# Patient Record
Sex: FEMALE | Race: WHITE | NOT HISPANIC OR LATINO
[De-identification: names, ages, dates, MRNs, and addresses within clinical notes are randomized per-mention and may not be internally consistent; named-entity substitution may affect disease eponyms.]

---

## 2021-03-16 ENCOUNTER — NON-APPOINTMENT (OUTPATIENT)
Age: 61
End: 2021-03-16

## 2021-03-16 ENCOUNTER — APPOINTMENT (OUTPATIENT)
Dept: CARDIOLOGY | Facility: CLINIC | Age: 61
End: 2021-03-16
Payer: COMMERCIAL

## 2021-03-16 VITALS
BODY MASS INDEX: 40.97 KG/M2 | HEIGHT: 64 IN | WEIGHT: 240 LBS | HEART RATE: 90 BPM | SYSTOLIC BLOOD PRESSURE: 150 MMHG | OXYGEN SATURATION: 98 % | DIASTOLIC BLOOD PRESSURE: 85 MMHG

## 2021-03-16 PROCEDURE — 93000 ELECTROCARDIOGRAM COMPLETE: CPT

## 2021-03-16 PROCEDURE — 99244 OFF/OP CNSLTJ NEW/EST MOD 40: CPT

## 2021-03-16 PROCEDURE — 99072 ADDL SUPL MATRL&STAF TM PHE: CPT

## 2021-03-16 RX ORDER — ASPIRIN ENTERIC COATED TABLETS 81 MG 81 MG/1
81 TABLET, DELAYED RELEASE ORAL
Refills: 0 | Status: ACTIVE | COMMUNITY

## 2021-03-17 LAB
25(OH)D3 SERPL-MCNC: 35.2 NG/ML
ALBUMIN SERPL ELPH-MCNC: 4.4 G/DL
ALP BLD-CCNC: 110 U/L
ALT SERPL-CCNC: 26 U/L
ANION GAP SERPL CALC-SCNC: 13 MMOL/L
AST SERPL-CCNC: 28 U/L
BASOPHILS # BLD AUTO: 0.06 K/UL
BASOPHILS NFR BLD AUTO: 0.5 %
BILIRUB SERPL-MCNC: 0.3 MG/DL
BUN SERPL-MCNC: 16 MG/DL
CALCIUM SERPL-MCNC: 9.6 MG/DL
CHLORIDE SERPL-SCNC: 105 MMOL/L
CHOLEST SERPL-MCNC: 256 MG/DL
CO2 SERPL-SCNC: 26 MMOL/L
COVID-19 NUCLEOCAPSID  GAM ANTIBODY INTERPRETATION: NEGATIVE
CREAT SERPL-MCNC: 0.8 MG/DL
EOSINOPHIL # BLD AUTO: 0.18 K/UL
EOSINOPHIL NFR BLD AUTO: 1.4 %
ESTIMATED AVERAGE GLUCOSE: 120 MG/DL
GLUCOSE SERPL-MCNC: 94 MG/DL
HBA1C MFR BLD HPLC: 5.8 %
HCT VFR BLD CALC: 42.9 %
HDLC SERPL-MCNC: 37 MG/DL
HGB BLD-MCNC: 13.5 G/DL
IMM GRANULOCYTES NFR BLD AUTO: 0.4 %
LDLC SERPL CALC-MCNC: 153 MG/DL
LYMPHOCYTES # BLD AUTO: 2.85 K/UL
LYMPHOCYTES NFR BLD AUTO: 22.8 %
MAN DIFF?: NORMAL
MCHC RBC-ENTMCNC: 27.1 PG
MCHC RBC-ENTMCNC: 31.5 GM/DL
MCV RBC AUTO: 86.1 FL
MONOCYTES # BLD AUTO: 0.77 K/UL
MONOCYTES NFR BLD AUTO: 6.2 %
NEUTROPHILS # BLD AUTO: 8.57 K/UL
NEUTROPHILS NFR BLD AUTO: 68.7 %
NONHDLC SERPL-MCNC: 219 MG/DL
PLATELET # BLD AUTO: 284 K/UL
POTASSIUM SERPL-SCNC: 4.1 MMOL/L
PROT SERPL-MCNC: 6.8 G/DL
RBC # BLD: 4.98 M/UL
RBC # FLD: 13.5 %
SARS-COV-2 AB SERPL QL IA: 0.08 INDEX
SODIUM SERPL-SCNC: 143 MMOL/L
TRIGL SERPL-MCNC: 325 MG/DL
TSH SERPL-ACNC: 0.33 UIU/ML
VIT B12 SERPL-MCNC: 603 PG/ML
WBC # FLD AUTO: 12.48 K/UL

## 2021-03-19 NOTE — PHYSICAL EXAM
[General Appearance - Well Developed] : well developed [Normal Appearance] : normal appearance [Well Groomed] : well groomed [General Appearance - Well Nourished] : well nourished [No Deformities] : no deformities [General Appearance - In No Acute Distress] : no acute distress [Normal Conjunctiva] : the conjunctiva exhibited no abnormalities [Eyelids - No Xanthelasma] : the eyelids demonstrated no xanthelasmas [Normal Oral Mucosa] : normal oral mucosa [No Oral Pallor] : no oral pallor [No Oral Cyanosis] : no oral cyanosis [Normal Jugular Venous A Waves Present] : normal jugular venous A waves present [Normal Jugular Venous V Waves Present] : normal jugular venous V waves present [No Jugular Venous King A Waves] : no jugular venous king A waves [Heart Rate And Rhythm] : heart rate and rhythm were normal [Heart Sounds] : normal S1 and S2 [Murmurs] : no murmurs present [Respiration, Rhythm And Depth] : normal respiratory rhythm and effort [Exaggerated Use Of Accessory Muscles For Inspiration] : no accessory muscle use [Auscultation Breath Sounds / Voice Sounds] : lungs were clear to auscultation bilaterally [Abdomen Soft] : soft [Abdomen Tenderness] : non-tender [Abdomen Mass (___ Cm)] : no abdominal mass palpated [Abnormal Walk] : normal gait [Gait - Sufficient For Exercise Testing] : the gait was sufficient for exercise testing [Nail Clubbing] : no clubbing of the fingernails [Cyanosis, Localized] : no localized cyanosis [Petechial Hemorrhages (___cm)] : no petechial hemorrhages [Skin Color & Pigmentation] : normal skin color and pigmentation [] : no rash [No Venous Stasis] : no venous stasis [Skin Lesions] : no skin lesions [No Skin Ulcers] : no skin ulcer [No Xanthoma] : no  xanthoma was observed [Oriented To Time, Place, And Person] : oriented to person, place, and time [Affect] : the affect was normal [Mood] : the mood was normal [No Anxiety] : not feeling anxious

## 2021-03-19 NOTE — HISTORY OF PRESENT ILLNESS
[FreeTextEntry1] : Tonya is a 60-year-old female whose  recently had OHS. This morning at work /105 and was feeling whoozy and headache. Took Tylenol and now /85. She has hyperlipidemia and was on crestor and stopped it a while ago. Stopped smoking 8 months ago and has gained 50 pounds. She gets short of breath easily. No chest pain, palpitations or syncope. 
Discharged

## 2021-03-19 NOTE — DISCUSSION/SUMMARY
[___ Month(s)] : [unfilled] month(s) [FreeTextEntry1] : The patient is a 60-year-old female recent ex-smoker, hyperlipidemia with symptomatic hypertension. \par #1 CV- echo ordered\par #2 Htn- start amlodipine 2.5mg and titrate to BP control\par #3 Lipids- start atorvastatin after labs today\par #4 General- Recent ex-smoker, 50 pound weight gain may be contributing to symptoms. \par We discussed adherence to a Mediterranean diet, weight loss and at least 30 minutes of daily exercise.\par Should receive COVID vaccine.

## 2021-03-22 RX ORDER — ATORVASTATIN CALCIUM 20 MG/1
20 TABLET, FILM COATED ORAL
Qty: 1 | Refills: 1 | Status: DISCONTINUED | COMMUNITY
Start: 2021-03-17 | End: 2021-03-22

## 2021-04-08 ENCOUNTER — APPOINTMENT (OUTPATIENT)
Dept: CARDIOLOGY | Facility: CLINIC | Age: 61
End: 2021-04-08
Payer: COMMERCIAL

## 2021-04-08 VITALS — SYSTOLIC BLOOD PRESSURE: 136 MMHG | DIASTOLIC BLOOD PRESSURE: 84 MMHG

## 2021-04-08 VITALS
DIASTOLIC BLOOD PRESSURE: 89 MMHG | BODY MASS INDEX: 36.54 KG/M2 | WEIGHT: 214 LBS | HEIGHT: 64 IN | SYSTOLIC BLOOD PRESSURE: 152 MMHG | OXYGEN SATURATION: 99 % | HEART RATE: 92 BPM

## 2021-04-08 PROCEDURE — 99072 ADDL SUPL MATRL&STAF TM PHE: CPT

## 2021-04-08 PROCEDURE — 93000 ELECTROCARDIOGRAM COMPLETE: CPT

## 2021-04-08 PROCEDURE — 99214 OFFICE O/P EST MOD 30 MIN: CPT

## 2021-04-09 NOTE — HISTORY OF PRESENT ILLNESS
[FreeTextEntry1] : Tonya is a 60-year-old female whose  recently had OHS. This morning at work /105 and was feeling whoozy and headache. Took Tylenol and now /85. She has hyperlipidemia and was on crestor and stopped it a while ago. Stopped smoking 8 months ago and has gained 50 pounds. She gets short of breath easily. No chest pain, palpitations or syncope.

## 2021-04-09 NOTE — DISCUSSION/SUMMARY
[___ Month(s)] : [unfilled] month(s) [FreeTextEntry1] : The patient is a 61-year-old female recent ex-smoker, hyperlipidemia with symptomatic hypertension. \par #1 CV- echo ordered\par #2 Htn- continue amlodipine 2.5 mg and titrate to BP control\par #3 Lipids- continue atorvastatin after labs today\par #4 General- Recent ex-smoker, 50 pound weight gain may be contributing to symptoms. \par We discussed adherence to a Mediterranean diet, weight loss and at least 30 minutes of daily exercise. Received 1st COVID vaccine.

## 2021-04-09 NOTE — REVIEW OF SYSTEMS
[Shortness Of Breath] : shortness of breath [Dyspnea on exertion] : dyspnea during exertion [Negative] : Heme/Lymph [Recent Weight Gain (___ Lbs)] : no recent weight gain [Recent Weight Loss (___ Lbs)] : recent [unfilled] ~Ulb weight loss [Chest Pain] : no chest pain [Lower Ext Edema] : no extremity edema [Palpitations] : no palpitations

## 2021-04-23 ENCOUNTER — OUTPATIENT (OUTPATIENT)
Dept: OUTPATIENT SERVICES | Facility: HOSPITAL | Age: 61
LOS: 1 days | End: 2021-04-23
Payer: COMMERCIAL

## 2021-04-23 ENCOUNTER — APPOINTMENT (OUTPATIENT)
Dept: CV DIAGNOSITCS | Facility: HOSPITAL | Age: 61
End: 2021-04-23

## 2021-04-23 DIAGNOSIS — I25.10 ATHEROSCLEROTIC HEART DISEASE OF NATIVE CORONARY ARTERY WITHOUT ANGINA PECTORIS: ICD-10-CM

## 2021-04-23 PROCEDURE — 93306 TTE W/DOPPLER COMPLETE: CPT

## 2021-04-23 PROCEDURE — 93306 TTE W/DOPPLER COMPLETE: CPT | Mod: 26

## 2021-06-10 ENCOUNTER — NON-APPOINTMENT (OUTPATIENT)
Age: 61
End: 2021-06-10

## 2021-06-10 ENCOUNTER — APPOINTMENT (OUTPATIENT)
Dept: CARDIOLOGY | Facility: CLINIC | Age: 61
End: 2021-06-10
Payer: COMMERCIAL

## 2021-06-10 VITALS
HEART RATE: 79 BPM | DIASTOLIC BLOOD PRESSURE: 74 MMHG | OXYGEN SATURATION: 96 % | BODY MASS INDEX: 36.54 KG/M2 | WEIGHT: 214 LBS | HEIGHT: 64 IN | SYSTOLIC BLOOD PRESSURE: 126 MMHG

## 2021-06-10 PROCEDURE — 99072 ADDL SUPL MATRL&STAF TM PHE: CPT

## 2021-06-10 PROCEDURE — 99214 OFFICE O/P EST MOD 30 MIN: CPT

## 2021-06-10 PROCEDURE — 93000 ELECTROCARDIOGRAM COMPLETE: CPT

## 2021-06-12 NOTE — DISCUSSION/SUMMARY
[___ Month(s)] : in [unfilled] month(s) [FreeTextEntry1] : The patient is a 61-year-old female recent ex-smoker, hyperlipidemia,hypertension. \par #1 CV- echo Nl function, diastolic dysfunction\par #2 Htn- continue amlodipine 5 mg \par #3 Lipids- continue atorvastatin \par #4 General- Recent ex-smoker, lost half of her weight gain.\par We discussed adherence to a Mediterranean diet, weight loss and at least 30 minutes of daily exercise. Received Moderna vaccine.

## 2021-06-12 NOTE — HISTORY OF PRESENT ILLNESS
[FreeTextEntry1] : Tonya is feeling well on current medications. Not smoking but her  is smoking. No chest pain, palpitations or shortness of breath. Maintained weight loss.

## 2021-06-12 NOTE — REVIEW OF SYSTEMS
[SOB] : no shortness of breath [Dyspnea on exertion] : not dyspnea during exertion [Chest Discomfort] : no chest discomfort [Lower Ext Edema] : no extremity edema [Leg Claudication] : no intermittent leg claudication [Palpitations] : no palpitations [Negative] : Heme/Lymph

## 2021-07-18 ENCOUNTER — NON-APPOINTMENT (OUTPATIENT)
Age: 61
End: 2021-07-18

## 2021-09-30 ENCOUNTER — APPOINTMENT (OUTPATIENT)
Dept: CARDIOLOGY | Facility: CLINIC | Age: 61
End: 2021-09-30
Payer: COMMERCIAL

## 2021-09-30 ENCOUNTER — NON-APPOINTMENT (OUTPATIENT)
Age: 61
End: 2021-09-30

## 2021-09-30 VITALS
BODY MASS INDEX: 34.15 KG/M2 | HEART RATE: 91 BPM | SYSTOLIC BLOOD PRESSURE: 125 MMHG | HEIGHT: 64 IN | WEIGHT: 200 LBS | OXYGEN SATURATION: 97 % | DIASTOLIC BLOOD PRESSURE: 71 MMHG

## 2021-09-30 DIAGNOSIS — R07.89 OTHER CHEST PAIN: ICD-10-CM

## 2021-09-30 PROCEDURE — 93000 ELECTROCARDIOGRAM COMPLETE: CPT

## 2021-09-30 PROCEDURE — 99214 OFFICE O/P EST MOD 30 MIN: CPT

## 2021-09-30 NOTE — HISTORY OF PRESENT ILLNESS
[FreeTextEntry1] : Tonya started smoking and stress eating. Her  is mentally ill and physically not well. Very difficult to deal with and she feels cannot leave him. The hospital gave him standing ovation when he was discharged because he was so hard to deal with. Otherwise feels well.

## 2021-09-30 NOTE — REVIEW OF SYSTEMS
[Negative] : Heme/Lymph [SOB] : no shortness of breath [Dyspnea on exertion] : not dyspnea during exertion [Chest Discomfort] : no chest discomfort [Lower Ext Edema] : no extremity edema [Leg Claudication] : no intermittent leg claudication [Palpitations] : no palpitations

## 2021-09-30 NOTE — DISCUSSION/SUMMARY
[FreeTextEntry1] : The patient is a 61-year-old female hyperlipidemia,hypertension who started smoking again secondary to stress.  \par #1 CV- echo Nl function, diastolic dysfunction\par #2 Htn- continue amlodipine 5 mg \par #3 Lipids- continue atorvastatin \par #4 General- Smoking cessation discussed. Emotional support provided. \par We discussed adherence to a Mediterranean diet, weight loss and at least 30 minutes of daily exercise. Received Moderna vaccine.

## 2021-10-01 LAB
25(OH)D3 SERPL-MCNC: 38.6 NG/ML
ALBUMIN SERPL ELPH-MCNC: 4.5 G/DL
ALP BLD-CCNC: 110 U/L
ALT SERPL-CCNC: 20 U/L
ANION GAP SERPL CALC-SCNC: 10 MMOL/L
AST SERPL-CCNC: 20 U/L
BASOPHILS # BLD AUTO: 0.05 K/UL
BASOPHILS NFR BLD AUTO: 0.5 %
BILIRUB SERPL-MCNC: 0.2 MG/DL
BUN SERPL-MCNC: 14 MG/DL
CALCIUM SERPL-MCNC: 9.8 MG/DL
CHLORIDE SERPL-SCNC: 105 MMOL/L
CHOLEST SERPL-MCNC: 149 MG/DL
CO2 SERPL-SCNC: 28 MMOL/L
CREAT SERPL-MCNC: 0.78 MG/DL
EOSINOPHIL # BLD AUTO: 0.15 K/UL
EOSINOPHIL NFR BLD AUTO: 1.6 %
ESTIMATED AVERAGE GLUCOSE: 123 MG/DL
GLUCOSE SERPL-MCNC: 91 MG/DL
HBA1C MFR BLD HPLC: 5.9 %
HCT VFR BLD CALC: 41.4 %
HDLC SERPL-MCNC: 41 MG/DL
HGB BLD-MCNC: 13.6 G/DL
IMM GRANULOCYTES NFR BLD AUTO: 0.3 %
LDLC SERPL CALC-MCNC: 67 MG/DL
LYMPHOCYTES # BLD AUTO: 3.01 K/UL
LYMPHOCYTES NFR BLD AUTO: 31.5 %
MAN DIFF?: NORMAL
MCHC RBC-ENTMCNC: 28.2 PG
MCHC RBC-ENTMCNC: 32.9 GM/DL
MCV RBC AUTO: 85.7 FL
MONOCYTES # BLD AUTO: 0.57 K/UL
MONOCYTES NFR BLD AUTO: 6 %
NEUTROPHILS # BLD AUTO: 5.75 K/UL
NEUTROPHILS NFR BLD AUTO: 60.1 %
NONHDLC SERPL-MCNC: 108 MG/DL
PLATELET # BLD AUTO: 252 K/UL
POTASSIUM SERPL-SCNC: 4.5 MMOL/L
PROT SERPL-MCNC: 6.7 G/DL
RBC # BLD: 4.83 M/UL
RBC # FLD: 13.2 %
SODIUM SERPL-SCNC: 143 MMOL/L
TRIGL SERPL-MCNC: 206 MG/DL
WBC # FLD AUTO: 9.56 K/UL

## 2022-01-06 ENCOUNTER — APPOINTMENT (OUTPATIENT)
Dept: CARDIOLOGY | Facility: CLINIC | Age: 62
End: 2022-01-06

## 2022-03-10 ENCOUNTER — RX RENEWAL (OUTPATIENT)
Age: 62
End: 2022-03-10

## 2022-05-10 ENCOUNTER — RX RENEWAL (OUTPATIENT)
Age: 62
End: 2022-05-10

## 2022-09-12 ENCOUNTER — RX RENEWAL (OUTPATIENT)
Age: 62
End: 2022-09-12

## 2022-10-31 ENCOUNTER — RX RENEWAL (OUTPATIENT)
Age: 62
End: 2022-10-31

## 2022-11-07 ENCOUNTER — RX RENEWAL (OUTPATIENT)
Age: 62
End: 2022-11-07

## 2023-01-30 ENCOUNTER — RX RENEWAL (OUTPATIENT)
Age: 63
End: 2023-01-30

## 2023-02-09 ENCOUNTER — RX RENEWAL (OUTPATIENT)
Age: 63
End: 2023-02-09

## 2023-03-15 ENCOUNTER — RX RENEWAL (OUTPATIENT)
Age: 63
End: 2023-03-15

## 2023-03-21 ENCOUNTER — NON-APPOINTMENT (OUTPATIENT)
Age: 63
End: 2023-03-21

## 2023-03-21 ENCOUNTER — APPOINTMENT (OUTPATIENT)
Dept: CARDIOLOGY | Facility: CLINIC | Age: 63
End: 2023-03-21
Payer: COMMERCIAL

## 2023-03-21 VITALS
WEIGHT: 210 LBS | HEIGHT: 64 IN | BODY MASS INDEX: 35.85 KG/M2 | DIASTOLIC BLOOD PRESSURE: 80 MMHG | OXYGEN SATURATION: 98 % | HEART RATE: 78 BPM | SYSTOLIC BLOOD PRESSURE: 139 MMHG

## 2023-03-21 LAB
BASOPHILS # BLD AUTO: 0.06 K/UL
BASOPHILS NFR BLD AUTO: 0.7 %
EOSINOPHIL # BLD AUTO: 0.18 K/UL
EOSINOPHIL NFR BLD AUTO: 2.1 %
HCT VFR BLD CALC: 43.6 %
HGB BLD-MCNC: 13.9 G/DL
IMM GRANULOCYTES NFR BLD AUTO: 0.2 %
LYMPHOCYTES # BLD AUTO: 2.83 K/UL
LYMPHOCYTES NFR BLD AUTO: 32.6 %
MAN DIFF?: NORMAL
MCHC RBC-ENTMCNC: 28.1 PG
MCHC RBC-ENTMCNC: 31.9 GM/DL
MCV RBC AUTO: 88.1 FL
MONOCYTES # BLD AUTO: 0.52 K/UL
MONOCYTES NFR BLD AUTO: 6 %
NEUTROPHILS # BLD AUTO: 5.08 K/UL
NEUTROPHILS NFR BLD AUTO: 58.4 %
PLATELET # BLD AUTO: 246 K/UL
RBC # BLD: 4.95 M/UL
RBC # FLD: 13.2 %
WBC # FLD AUTO: 8.69 K/UL

## 2023-03-21 PROCEDURE — 99214 OFFICE O/P EST MOD 30 MIN: CPT | Mod: 25

## 2023-03-21 PROCEDURE — 93000 ELECTROCARDIOGRAM COMPLETE: CPT

## 2023-03-21 NOTE — HISTORY OF PRESENT ILLNESS
[FreeTextEntry1] : Tonya continues to work at [employer] and walks almost 30,000 steps a day.  She has cut down on her smoking to 3 to 5 cigarettes a day.  No other symptoms

## 2023-03-21 NOTE — DISCUSSION/SUMMARY
[FreeTextEntry1] : The patient is a 62-year-old female THN, HLD who has cut down her smoking.\par #1 CV- echo Nl function, diastolic dysfunction\par #2 HTN- continue amlodipine 5 mg \par #3 HLD- continue atorvastatin, complete labs today\par #4 General- Smoking cessation discussed. Emotional support provided. \par We discussed adherence to a Mediterranean diet, weight loss and at least 30 minutes of daily exercise. Received Moderna vaccine.  [EKG obtained to assist in diagnosis and management of assessed problem(s)] : EKG obtained to assist in diagnosis and management of assessed problem(s)

## 2023-03-22 LAB
25(OH)D3 SERPL-MCNC: 34.3 NG/ML
ALBUMIN SERPL ELPH-MCNC: 4.4 G/DL
ALP BLD-CCNC: 107 U/L
ALT SERPL-CCNC: 20 U/L
ANION GAP SERPL CALC-SCNC: 13 MMOL/L
AST SERPL-CCNC: 21 U/L
BILIRUB SERPL-MCNC: 0.2 MG/DL
BUN SERPL-MCNC: 12 MG/DL
CALCIUM SERPL-MCNC: 9.4 MG/DL
CHLORIDE SERPL-SCNC: 108 MMOL/L
CHOLEST SERPL-MCNC: 142 MG/DL
CO2 SERPL-SCNC: 26 MMOL/L
CREAT SERPL-MCNC: 0.82 MG/DL
EGFR: 81 ML/MIN/1.73M2
ESTIMATED AVERAGE GLUCOSE: 123 MG/DL
GLUCOSE SERPL-MCNC: 93 MG/DL
HBA1C MFR BLD HPLC: 5.9 %
HDLC SERPL-MCNC: 42 MG/DL
LDLC SERPL CALC-MCNC: 73 MG/DL
NONHDLC SERPL-MCNC: 101 MG/DL
POTASSIUM SERPL-SCNC: 4.5 MMOL/L
PROT SERPL-MCNC: 6.6 G/DL
SODIUM SERPL-SCNC: 146 MMOL/L
TRIGL SERPL-MCNC: 136 MG/DL

## 2023-05-01 ENCOUNTER — RX RENEWAL (OUTPATIENT)
Age: 63
End: 2023-05-01

## 2023-06-13 ENCOUNTER — RX RENEWAL (OUTPATIENT)
Age: 63
End: 2023-06-13

## 2023-09-28 ENCOUNTER — APPOINTMENT (OUTPATIENT)
Dept: CARDIOLOGY | Facility: CLINIC | Age: 63
End: 2023-09-28

## 2024-02-25 NOTE — PHYSICAL EXAM
[Well Nourished] : well nourished [Well Developed] : well developed [No Acute Distress] : no acute distress [Normal Conjunctiva] : normal conjunctiva [Normal Venous Pressure] : normal venous pressure [Normal S1, S2] : normal S1, S2 [No Carotid Bruit] : no carotid bruit [No Rub] : no rub [No Murmur] : no murmur [Clear Lung Fields] : clear lung fields [No Gallop] : no gallop [Good Air Entry] : good air entry [No Respiratory Distress] : no respiratory distress  [No Masses/organomegaly] : no masses/organomegaly [Soft] : abdomen soft [Non Tender] : non-tender [Normal Gait] : normal gait [Normal Bowel Sounds] : normal bowel sounds [No Cyanosis] : no cyanosis [No Edema] : no edema [No Clubbing] : no clubbing [No Varicosities] : no varicosities [No Rash] : no rash [Moves all extremities] : moves all extremities [No Skin Lesions] : no skin lesions [Normal Speech] : normal speech [No Focal Deficits] : no focal deficits [Normal memory] : normal memory [Alert and Oriented] : alert and oriented

## 2024-02-27 ENCOUNTER — NON-APPOINTMENT (OUTPATIENT)
Age: 64
End: 2024-02-27

## 2024-02-27 ENCOUNTER — TRANSCRIPTION ENCOUNTER (OUTPATIENT)
Age: 64
End: 2024-02-27

## 2024-02-27 ENCOUNTER — APPOINTMENT (OUTPATIENT)
Dept: CARDIOLOGY | Facility: CLINIC | Age: 64
End: 2024-02-27
Payer: COMMERCIAL

## 2024-02-27 VITALS
HEIGHT: 64 IN | DIASTOLIC BLOOD PRESSURE: 83 MMHG | SYSTOLIC BLOOD PRESSURE: 145 MMHG | WEIGHT: 180 LBS | OXYGEN SATURATION: 99 % | BODY MASS INDEX: 30.73 KG/M2 | HEART RATE: 88 BPM

## 2024-02-27 VITALS
WEIGHT: 180 LBS | BODY MASS INDEX: 24.38 KG/M2 | OXYGEN SATURATION: 97 % | HEIGHT: 72 IN | HEART RATE: 89 BPM | DIASTOLIC BLOOD PRESSURE: 81 MMHG | SYSTOLIC BLOOD PRESSURE: 134 MMHG

## 2024-02-27 DIAGNOSIS — E78.5 HYPERLIPIDEMIA, UNSPECIFIED: ICD-10-CM

## 2024-02-27 LAB
ESTIMATED AVERAGE GLUCOSE: 123 MG/DL
HBA1C MFR BLD HPLC: 5.9 %

## 2024-02-27 PROCEDURE — 99214 OFFICE O/P EST MOD 30 MIN: CPT | Mod: 25

## 2024-02-27 PROCEDURE — 93000 ELECTROCARDIOGRAM COMPLETE: CPT

## 2024-02-27 NOTE — REVIEW OF SYSTEMS
[Weight Gain (___ Lbs)] : [unfilled] ~Ulb weight gain [Negative] : Heme/Lymph [Dyspnea on exertion] : not dyspnea during exertion [SOB] : no shortness of breath [Lower Ext Edema] : no extremity edema [Chest Discomfort] : no chest discomfort [Palpitations] : no palpitations [Leg Claudication] : no intermittent leg claudication

## 2024-02-27 NOTE — DISCUSSION/SUMMARY
[FreeTextEntry1] : The patient is a 63-year-old female HTN, HLD who has cut down her smoking. #1 CV- echo Nl function, diastolic dysfunction #2 HTN- c/w amlodipine 5 mg  #3 HLD- c/w atorvastatin, complete labs today #4 General- Smoking cessation discussed. Emotional support provided.  We discussed adherence to a Mediterranean diet, weight loss and at least 30 minutes of daily exercise. Received Moderna vaccine. Walks alot at work in [employer] [EKG obtained to assist in diagnosis and management of assessed problem(s)] : EKG obtained to assist in diagnosis and management of assessed problem(s)

## 2024-02-27 NOTE — HISTORY OF PRESENT ILLNESS
[FreeTextEntry1] : Tonya does over 12,000 steps a day working in [place]. No sx. Still smokes but less.

## 2024-02-28 LAB
25(OH)D3 SERPL-MCNC: 38.9 NG/ML
ALBUMIN SERPL ELPH-MCNC: 4.5 G/DL
ALP BLD-CCNC: 102 U/L
ALT SERPL-CCNC: 21 U/L
ANION GAP SERPL CALC-SCNC: 13 MMOL/L
AST SERPL-CCNC: 22 U/L
BILIRUB SERPL-MCNC: 0.4 MG/DL
BUN SERPL-MCNC: 15 MG/DL
CALCIUM SERPL-MCNC: 9.7 MG/DL
CHLORIDE SERPL-SCNC: 104 MMOL/L
CHOLEST SERPL-MCNC: 161 MG/DL
CO2 SERPL-SCNC: 26 MMOL/L
CREAT SERPL-MCNC: 0.87 MG/DL
EGFR: 75 ML/MIN/1.73M2
GLUCOSE SERPL-MCNC: 80 MG/DL
HCT VFR BLD CALC: 42.3 %
HDLC SERPL-MCNC: 41 MG/DL
HGB BLD-MCNC: 13.9 G/DL
LDLC SERPL CALC-MCNC: 87 MG/DL
MCHC RBC-ENTMCNC: 27.7 PG
MCHC RBC-ENTMCNC: 32.9 GM/DL
MCV RBC AUTO: 84.4 FL
NONHDLC SERPL-MCNC: 120 MG/DL
PLATELET # BLD AUTO: 265 K/UL
POTASSIUM SERPL-SCNC: 4.3 MMOL/L
PROT SERPL-MCNC: 7.1 G/DL
RBC # BLD: 5.01 M/UL
RBC # FLD: 13.5 %
SODIUM SERPL-SCNC: 143 MMOL/L
TRIGL SERPL-MCNC: 191 MG/DL
TSH SERPL-ACNC: 0.72 UIU/ML
VIT B12 SERPL-MCNC: 532 PG/ML
WBC # FLD AUTO: 9.42 K/UL

## 2024-04-05 ENCOUNTER — RX RENEWAL (OUTPATIENT)
Age: 64
End: 2024-04-05

## 2024-04-05 RX ORDER — AMLODIPINE BESYLATE 5 MG/1
5 TABLET ORAL
Qty: 90 | Refills: 1 | Status: ACTIVE | COMMUNITY
Start: 2021-03-16 | End: 1900-01-01

## 2024-05-01 ENCOUNTER — RESULT CHARGE (OUTPATIENT)
Age: 64
End: 2024-05-01

## 2024-05-02 DIAGNOSIS — Z13.1 ENCOUNTER FOR SCREENING FOR DIABETES MELLITUS: ICD-10-CM

## 2024-05-03 ENCOUNTER — APPOINTMENT (OUTPATIENT)
Dept: INTERNAL MEDICINE | Facility: CLINIC | Age: 64
End: 2024-05-03
Payer: COMMERCIAL

## 2024-05-03 VITALS
DIASTOLIC BLOOD PRESSURE: 70 MMHG | SYSTOLIC BLOOD PRESSURE: 132 MMHG | TEMPERATURE: 97.7 F | HEIGHT: 64 IN | RESPIRATION RATE: 16 BRPM | BODY MASS INDEX: 34.49 KG/M2 | OXYGEN SATURATION: 96 % | WEIGHT: 202 LBS | HEART RATE: 83 BPM

## 2024-05-03 DIAGNOSIS — Z12.31 ENCOUNTER FOR SCREENING MAMMOGRAM FOR MALIGNANT NEOPLASM OF BREAST: ICD-10-CM

## 2024-05-03 DIAGNOSIS — R73.9 HYPERGLYCEMIA, UNSPECIFIED: ICD-10-CM

## 2024-05-03 DIAGNOSIS — F17.200 NICOTINE DEPENDENCE, UNSPECIFIED, UNCOMPLICATED: ICD-10-CM

## 2024-05-03 DIAGNOSIS — Z13.820 ENCOUNTER FOR SCREENING FOR OSTEOPOROSIS: ICD-10-CM

## 2024-05-03 DIAGNOSIS — Z00.00 ENCOUNTER FOR GENERAL ADULT MEDICAL EXAMINATION W/OUT ABNORMAL FINDINGS: ICD-10-CM

## 2024-05-03 DIAGNOSIS — E66.3 OVERWEIGHT: ICD-10-CM

## 2024-05-03 DIAGNOSIS — E66.9 OVERWEIGHT: ICD-10-CM

## 2024-05-03 DIAGNOSIS — I10 ESSENTIAL (PRIMARY) HYPERTENSION: ICD-10-CM

## 2024-05-03 DIAGNOSIS — E78.1 PURE HYPERGLYCERIDEMIA: ICD-10-CM

## 2024-05-03 PROCEDURE — G2211 COMPLEX E/M VISIT ADD ON: CPT | Mod: NC,1L

## 2024-05-03 PROCEDURE — 93000 ELECTROCARDIOGRAM COMPLETE: CPT

## 2024-05-03 PROCEDURE — 99386 PREV VISIT NEW AGE 40-64: CPT

## 2024-05-03 NOTE — PLAN
[FreeTextEntry1] : Cardiology 1. HTN-stable on Amlodipine 5 mg qd, continue low sodium diet, weight loss recommended, check EKG (as above) 2. hyperlipidemia-continue with Rosuvastatin 20 mg qd, c/w low cholesterol diet 3. hypertriglyceridemia-low carbohydrate/cholesterol diet, continue to monitor continue cardiology follow up with Dr. Quintanilla-consider vascular evaluation with carotid dopplers, CT heart/calcium score, lower extremity arterial dopplers in light of CV risk factors especially long smoking history Endocrinology 1. hyperglycemia-A1C has ranged from 5.8%-5.9% over past 5 years-recommend low carbohydrate diet and increasing physical activity-consider starting GLP-1 agonist and or Metformin in the future depending on glycemic control 2. obesity-as above 3. Screening for osteoporosis-RX given for DEXA scan with FRAX score, start calcium with vitamin D 0421-0639 mg qd, discussed weight bearing exercise like walking and its benefits in increasing bone mineral density Pulmonary current smoker-40 pack years-discussed smoking cessation, patient recently quit smoking but slowly returned Otolaryngology 1. hoarseness-possibly related to GERD vs long smoking history-upcoming consultation with ENT, Dr. Sepulveda scheduled Gynecology 1. breast cancer screening-RX for bilateral screening mammogram given to patient 2. patient referred to [facility] for GYN consultation Gastroenterology 1. colon cancer screening-referred to GI for consultation prior to colonoscopy, FIT test kit given Health Care Maintenence discussed importance of updating vaccines including shingles, pneumonia, influenza, tetanus etc  Follow up in 4 months for fasting blood work

## 2024-05-03 NOTE — HISTORY OF PRESENT ILLNESS
[Spouse] : spouse [FreeTextEntry1] : New patient annual wellness exam [de-identified] : The patient is a 64 year old female [facility] as below who presents for a new patient annual wellness exam and to establish care.  She follows with cardiologist, Dr. Quintanilla and recently had blood work 2/27/24.  This blood work was normal except for hemoglobin A1C of 5.9%, triglycerides 191mg/d, HDL 41 mg/dL.  She is taking Amlodipine 5 mg daily for hypertension.  She denies dizziness or lightheadedness.  She is also taking Rosuvastatin 20 mg daily for hyperlipidemia and she denies any diffuse muscle aches/joint aches.  The patient notes multiple, intermittent episodes of laryngitis and hoarseness over the past year.  These have increased in frequency over the past 3 months.  She has a 40 pack year smoking history and is currently smoking.  She has an upcoming consultation with otolaryngologist, Dr. Jonel Sepulveda.  The patient has never gone for a screening colonoscopy.  She is willing to see a gastroenterologist for an initial consultation and she is considering going for colonoscopy.  She agrees to do FIT testing.  She has not seen a gynecologist in many years and is willing to see one for an initial consultation.  The patient is not UTD with breast cancer screening but is willing to go for a screening mammogram.  She has never had a bone density test but is willing to go for DEXA scan.  She received the initial 2 covid vaccines.  She has not had shingles vaccines, pneumonia vaccine, influenza vaccine, tetanus booster or RSV vaccine.  She will consider going for these in the future.

## 2024-05-03 NOTE — PHYSICAL EXAM
[Normal TMs] : both tympanic membranes were normal [Normal Nasal Mucosa] : the nasal mucosa was normal [Declined Breast Exam] : declined breast exam  [Declined Rectal Exam] : declined rectal exam [Normal Supraclavicular Nodes] : no supraclavicular lymphadenopathy [Kyphosis] : no kyphosis [Scoliosis] : no scoliosis [No Skin Lesions] : no skin lesions [Acne] : no acne [Speech Grossly Normal] : speech grossly normal [Memory Grossly Normal] : memory grossly normal [Normal Affect] : the affect was normal [Alert and Oriented x3] : oriented to person, place, and time [Normal Mood] : the mood was normal [Normal Insight/Judgement] : insight and judgment were intact [Normal] : affect was normal and insight and judgment were intact [de-identified] : hoarseness [de-identified] : to be done by GYN [de-identified] : obese [FreeTextEntry1] : to be done by GI

## 2024-05-03 NOTE — ASSESSMENT
[FreeTextEntry1] : 64 year old female PMH as above presents for new patient annual wellness exam and to establish care.

## 2024-05-03 NOTE — HEALTH RISK ASSESSMENT
[Monthly or less (1 pt)] : Monthly or less (1 point) [1 or 2 (0 pts)] : 1 or 2 (0 points) [Never (0 pts)] : Never (0 points) [0] : 2) Feeling down, depressed, or hopeless: Not at all (0) [PHQ-2 Negative - No further assessment needed] : PHQ-2 Negative - No further assessment needed [Audit-CScore] : 1 [[id number]] : 0

## 2024-05-10 ENCOUNTER — RESULT REVIEW (OUTPATIENT)
Age: 64
End: 2024-05-10

## 2024-05-10 ENCOUNTER — APPOINTMENT (OUTPATIENT)
Dept: RADIOLOGY | Facility: CLINIC | Age: 64
End: 2024-05-10
Payer: COMMERCIAL

## 2024-05-10 ENCOUNTER — APPOINTMENT (OUTPATIENT)
Dept: MAMMOGRAPHY | Facility: CLINIC | Age: 64
End: 2024-05-10
Payer: COMMERCIAL

## 2024-05-10 PROCEDURE — 77063 BREAST TOMOSYNTHESIS BI: CPT

## 2024-05-10 PROCEDURE — 77085 DXA BONE DENSITY AXL VRT FX: CPT

## 2024-05-10 PROCEDURE — 77067 SCR MAMMO BI INCL CAD: CPT

## 2024-05-15 DIAGNOSIS — R92.8 OTHER ABNORMAL AND INCONCLUSIVE FINDINGS ON DIAGNOSTIC IMAGING OF BREAST: ICD-10-CM

## 2024-05-19 DIAGNOSIS — Z12.11 ENCOUNTER FOR SCREENING FOR MALIGNANT NEOPLASM OF COLON: ICD-10-CM

## 2024-05-24 ENCOUNTER — APPOINTMENT (OUTPATIENT)
Dept: ULTRASOUND IMAGING | Facility: CLINIC | Age: 64
End: 2024-05-24
Payer: COMMERCIAL

## 2024-05-24 ENCOUNTER — APPOINTMENT (OUTPATIENT)
Dept: OTOLARYNGOLOGY | Facility: CLINIC | Age: 64
End: 2024-05-24
Payer: COMMERCIAL

## 2024-05-24 VITALS
HEIGHT: 64 IN | DIASTOLIC BLOOD PRESSURE: 80 MMHG | HEART RATE: 85 BPM | WEIGHT: 200 LBS | SYSTOLIC BLOOD PRESSURE: 124 MMHG | BODY MASS INDEX: 34.15 KG/M2

## 2024-05-24 DIAGNOSIS — J31.0 CHRONIC RHINITIS: ICD-10-CM

## 2024-05-24 DIAGNOSIS — J34.2 DEVIATED NASAL SEPTUM: ICD-10-CM

## 2024-05-24 DIAGNOSIS — R49.0 DYSPHONIA: ICD-10-CM

## 2024-05-24 DIAGNOSIS — Z86.79 PERSONAL HISTORY OF OTHER DISEASES OF THE CIRCULATORY SYSTEM: ICD-10-CM

## 2024-05-24 DIAGNOSIS — R06.83 SNORING: ICD-10-CM

## 2024-05-24 DIAGNOSIS — J38.1 POLYP OF VOCAL CORD AND LARYNX: ICD-10-CM

## 2024-05-24 PROCEDURE — 76856 US EXAM PELVIC COMPLETE: CPT | Mod: 59

## 2024-05-24 PROCEDURE — 76830 TRANSVAGINAL US NON-OB: CPT

## 2024-05-24 PROCEDURE — 99203 OFFICE O/P NEW LOW 30 MIN: CPT | Mod: 25

## 2024-05-24 PROCEDURE — 31575 DIAGNOSTIC LARYNGOSCOPY: CPT

## 2024-05-24 RX ORDER — CHOLECALCIFEROL (VITAMIN D3) 25 MCG
TABLET ORAL
Refills: 0 | Status: ACTIVE | COMMUNITY

## 2024-05-24 NOTE — PROCEDURE
[Complicated Symptoms] : complicated symptoms requiring more thorough examination than provided by mirror [de-identified] :  Procedure: Flexible Fiberoptic Laryngoscopy: Risks, benefits, and alternatives of flexible endoscopy were explained to the patient. The patient gave oral consent to proceed. The flexible scope was inserted into the right nasal cavity and advanced towards the nasopharynx. Visualized mucosa over the turbinates and septum were as described above. The nasopharynx was clear. Oropharyngeal walls were symmetric and mobile without lesion, mass, or edema. Hypopharynx was also without lesion or edema. Larynx was mobile without lesions. Supraglottic structures were free of edema, mass, and asymmetry. True vocal folds were white without mass or lesion. 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 Base of tongue was within normal limits. -narrowing at the level of the soft palate -Timi's edema

## 2024-05-24 NOTE — HISTORY OF PRESENT ILLNESS
[de-identified] : Patient presents today stating that she has had hoarseness for a long time that has gotten worse over the past 6 months. She states that she smokes 6-8 cigarettes per day. She states that she snores at night without witnessed pauses.

## 2024-05-24 NOTE — ADDENDUM
[FreeTextEntry1] :  Documented by Jaret Barrera acting as scribe for Dr. Sepulveda on 05/24/2024. All Medical record entries made by the Scribe were at my, Dr. Sepulveda, direction and personally dictated by me on 05/24/2024 . I have reviewed the chart and agree that the record accurately reflects my personal performance of the history, physical exam, assessment and plan. I have also personally directed, reviewed, and agreed with the discharge instructions.

## 2024-05-24 NOTE — ASSESSMENT
[FreeTextEntry1] : Reviewed and reconciled medications, allergies, PMHx, PSHx, SocHx, FMHx.  Patient presents today stating that she has had hoarseness for a long time that has gotten worse over the past 6 months. She states that she smokes 6-8 cigarettes per day. She states that she snores at night without witnessed pauses.   Physical exam: -ears are clean and clear -no lateraliation to tuning forks -deviated septum bilaterally with a spur on the left floor -mildly inflamed turbinates -full upper and lower dentures -class 1 tonsils  ENT Procedure: Flexible laryngoscopy -narrowing at the level of the soft palate -Timi's edema   Plan: -Try to quit smoking -Ordered Videostrobe -FU with results from Videostrobe

## 2024-05-24 NOTE — CONSULT LETTER
[Dear  ___] : Dear  [unfilled], [Consult Letter:] : I had the pleasure of evaluating your patient, [unfilled]. [Please see my note below.] : Please see my note below. [Consult Closing:] : Thank you very much for allowing me to participate in the care of this patient.  If you have any questions, please do not hesitate to contact me. [Sincerely,] : Sincerely, [FreeTextEntry3] :  Jonel Sepulveda MD FACS

## 2024-05-24 NOTE — PHYSICAL EXAM
[Hearing Sloan Test (Tuning Fork On Forehead)] : no lateralization of tone [] : septum deviated bilaterally [Midline] : trachea located in midline position [Normal] : no rashes [Hearing Loss Right Only] : normal [Hearing Loss Left Only] : normal [de-identified] :  mildly inflamed turbinates [de-identified] : full upper and lower dentures [de-identified] : class 1

## 2024-05-31 ENCOUNTER — RESULT REVIEW (OUTPATIENT)
Age: 64
End: 2024-05-31

## 2024-05-31 ENCOUNTER — APPOINTMENT (OUTPATIENT)
Dept: ULTRASOUND IMAGING | Facility: CLINIC | Age: 64
End: 2024-05-31
Payer: COMMERCIAL

## 2024-05-31 DIAGNOSIS — N60.02 SOLITARY CYST OF LEFT BREAST: ICD-10-CM

## 2024-05-31 PROCEDURE — 76641 ULTRASOUND BREAST COMPLETE: CPT | Mod: 50

## 2024-06-05 ENCOUNTER — RX RENEWAL (OUTPATIENT)
Age: 64
End: 2024-06-05

## 2024-06-05 RX ORDER — ROSUVASTATIN CALCIUM 20 MG/1
20 TABLET, FILM COATED ORAL
Qty: 90 | Refills: 3 | Status: ACTIVE | COMMUNITY
Start: 2021-03-22 | End: 1900-01-01

## 2024-06-06 ENCOUNTER — RESULT REVIEW (OUTPATIENT)
Age: 64
End: 2024-06-06

## 2024-06-10 NOTE — PHYSICAL EXAM
(1) Oriented to own ability [Well Developed] : well developed [Well Nourished] : well nourished [No Acute Distress] : no acute distress [Normal Conjunctiva] : normal conjunctiva [Normal Venous Pressure] : normal venous pressure [No Carotid Bruit] : no carotid bruit [Normal S1, S2] : normal S1, S2 [No Murmur] : no murmur [No Rub] : no rub [No Gallop] : no gallop [Clear Lung Fields] : clear lung fields [Good Air Entry] : good air entry [No Respiratory Distress] : no respiratory distress  [Soft] : abdomen soft [Non Tender] : non-tender [No Masses/organomegaly] : no masses/organomegaly [Normal Bowel Sounds] : normal bowel sounds [Normal Gait] : normal gait [No Edema] : no edema [No Cyanosis] : no cyanosis [No Clubbing] : no clubbing [No Varicosities] : no varicosities [No Rash] : no rash [No Skin Lesions] : no skin lesions [Moves all extremities] : moves all extremities [No Focal Deficits] : no focal deficits [Normal Speech] : normal speech [Alert and Oriented] : alert and oriented [Normal memory] : normal memory

## 2024-06-20 ENCOUNTER — APPOINTMENT (OUTPATIENT)
Dept: ULTRASOUND IMAGING | Facility: CLINIC | Age: 64
End: 2024-06-20
Payer: COMMERCIAL

## 2024-06-20 ENCOUNTER — RESULT REVIEW (OUTPATIENT)
Age: 64
End: 2024-06-20

## 2024-06-20 PROCEDURE — 77065 DX MAMMO INCL CAD UNI: CPT | Mod: LT

## 2024-06-20 PROCEDURE — A4648: CPT | Mod: 59

## 2024-06-20 PROCEDURE — 19083 BX BREAST 1ST LESION US IMAG: CPT | Mod: LT

## 2024-07-01 ENCOUNTER — NON-APPOINTMENT (OUTPATIENT)
Age: 64
End: 2024-07-01

## 2024-07-09 ENCOUNTER — APPOINTMENT (OUTPATIENT)
Dept: OTOLARYNGOLOGY | Facility: CLINIC | Age: 64
End: 2024-07-09
Payer: COMMERCIAL

## 2024-07-09 PROCEDURE — 31579 LARYNGOSCOPY TELESCOPIC: CPT | Mod: GN

## 2024-07-09 PROCEDURE — 92524 BEHAVRAL QUALIT ANALYS VOICE: CPT | Mod: GN

## 2024-07-10 ENCOUNTER — APPOINTMENT (OUTPATIENT)
Dept: OTOLARYNGOLOGY | Facility: CLINIC | Age: 64
End: 2024-07-10
Payer: COMMERCIAL

## 2024-07-10 VITALS
HEART RATE: 90 BPM | SYSTOLIC BLOOD PRESSURE: 132 MMHG | DIASTOLIC BLOOD PRESSURE: 78 MMHG | HEIGHT: 64 IN | BODY MASS INDEX: 33.97 KG/M2 | WEIGHT: 199 LBS

## 2024-07-10 DIAGNOSIS — R49.0 DYSPHONIA: ICD-10-CM

## 2024-07-10 DIAGNOSIS — J38.1 POLYP OF VOCAL CORD AND LARYNX: ICD-10-CM

## 2024-07-10 PROCEDURE — 99214 OFFICE O/P EST MOD 30 MIN: CPT

## 2024-07-11 ENCOUNTER — NON-APPOINTMENT (OUTPATIENT)
Age: 64
End: 2024-07-11

## 2024-07-11 ENCOUNTER — LABORATORY RESULT (OUTPATIENT)
Age: 64
End: 2024-07-11

## 2024-07-11 ENCOUNTER — APPOINTMENT (OUTPATIENT)
Dept: INTERNAL MEDICINE | Facility: CLINIC | Age: 64
End: 2024-07-11
Payer: COMMERCIAL

## 2024-07-11 VITALS
OXYGEN SATURATION: 98 % | DIASTOLIC BLOOD PRESSURE: 76 MMHG | RESPIRATION RATE: 14 BRPM | HEIGHT: 64 IN | HEART RATE: 94 BPM | SYSTOLIC BLOOD PRESSURE: 130 MMHG | WEIGHT: 199 LBS | BODY MASS INDEX: 33.97 KG/M2

## 2024-07-11 DIAGNOSIS — J38.3 OTHER DISEASES OF VOCAL CORDS: ICD-10-CM

## 2024-07-11 DIAGNOSIS — Z01.818 ENCOUNTER FOR OTHER PREPROCEDURAL EXAMINATION: ICD-10-CM

## 2024-07-11 PROCEDURE — 99214 OFFICE O/P EST MOD 30 MIN: CPT

## 2024-07-16 ENCOUNTER — APPOINTMENT (OUTPATIENT)
Dept: OTOLARYNGOLOGY | Facility: AMBULATORY MEDICAL SERVICES | Age: 64
End: 2024-07-16

## 2024-07-16 ENCOUNTER — RESULT REVIEW (OUTPATIENT)
Age: 64
End: 2024-07-16

## 2024-07-16 PROCEDURE — 31571 LARYNGOSCOP W/VC INJ + SCOPE: CPT

## 2024-07-16 PROCEDURE — 31536 LARYNGOSCOPY W/BX & OP SCOPE: CPT | Mod: RT

## 2024-07-26 ENCOUNTER — APPOINTMENT (OUTPATIENT)
Dept: OTOLARYNGOLOGY | Facility: CLINIC | Age: 64
End: 2024-07-26
Payer: COMMERCIAL

## 2024-07-26 VITALS
SYSTOLIC BLOOD PRESSURE: 136 MMHG | BODY MASS INDEX: 33.8 KG/M2 | WEIGHT: 198 LBS | DIASTOLIC BLOOD PRESSURE: 76 MMHG | HEART RATE: 76 BPM | HEIGHT: 64 IN

## 2024-07-26 DIAGNOSIS — J38.1 POLYP OF VOCAL CORD AND LARYNX: ICD-10-CM

## 2024-07-26 DIAGNOSIS — R49.0 DYSPHONIA: ICD-10-CM

## 2024-07-26 DIAGNOSIS — J31.0 CHRONIC RHINITIS: ICD-10-CM

## 2024-07-26 DIAGNOSIS — J34.2 DEVIATED NASAL SEPTUM: ICD-10-CM

## 2024-07-26 PROCEDURE — 31575 DIAGNOSTIC LARYNGOSCOPY: CPT

## 2024-07-26 PROCEDURE — 99213 OFFICE O/P EST LOW 20 MIN: CPT | Mod: 25

## 2024-07-26 NOTE — CONSULT LETTER
[Dear  ___] : Dear  [unfilled], [Courtesy Letter:] : I had the pleasure of seeing your patient, [unfilled], in my office today. [Please see my note below.] : Please see my note below. [Consult Closing:] : Thank you very much for allowing me to participate in the care of this patient.  If you have any questions, please do not hesitate to contact me. [Sincerely,] : Sincerely, [FreeTextEntry3] :  Jonel Sepulveda MD FACS

## 2024-07-26 NOTE — PHYSICAL EXAM
[Normal] : mucosa is normal [Midline] : trachea located in midline position [de-identified] : upper and lower dentures

## 2024-07-26 NOTE — ADDENDUM
[FreeTextEntry1] :  Documented by Jaret Barrera acting as scribe for Dr. Sepulveda on 07/26/2024. All Medical record entries made by the Scribe were at my, Dr. Sepulveda, direction and personally dictated by me on 07/26/2024 . I have reviewed the chart and agree that the record accurately reflects my personal performance of the history, physical exam, assessment and plan. I have also personally directed, reviewed, and agreed with the discharge instructions.

## 2024-07-26 NOTE — ASSESSMENT
[FreeTextEntry1] : Reviewed and reconciled medications, allergies, PMHx, PSHx, SocHx, FMHx.  Patient with h/o snoring and Timi's edema with hoarseness presents today s/p direct suspension microlaryngoscopy with excision of right vocal cord mass and then bilateral vocal cord injections with Depo-Medrol 7/16/24. She states that her voice has improved since the surgery. She states that she has not been smoking.  Pathology 7/16/24: -polyp - all benign   Physical exam: -upper and lower dentures  ENT Procedure: Flexible laryngoscopy -erythema in the nasopharynx -large edematous polyp on the left vocal cord -tiny tag on the right vocal cord  Plan: -FU in 1 month

## 2024-07-26 NOTE — PROCEDURE
[Hoarseness] : hoarseness not clearly evaluated by indirect laryngoscopy [Complicated Symptoms] : complicated symptoms requiring more thorough examination than provided by mirror [de-identified] :  Procedure: Flexible Fiberoptic Laryngoscopy: Risks, benefits, and alternatives of flexible endoscopy were explained to the patient. The patient gave oral consent to proceed. The flexible scope was inserted into the right nasal cavity and advanced towards the nasopharynx. Visualized mucosa over the turbinates and septum were as described above. The nasopharynx with erythema. Oropharyngeal walls were symmetric and mobile without lesion, mass, or edema. Hypopharynx was also without lesion or edema. Larynx was mobile without lesions. Supraglottic structures were free of edema, mass, and asymmetry. True vocal folds were white, but there is a large edematous polyp on the left vocal cord and a large tag on the right vocal cord. Base of tongue was within normal limits. -erythema in the nasopharynx -large edematous polyp on the left vocal cord -tiny tag on the right vocal cord

## 2024-07-26 NOTE — HISTORY OF PRESENT ILLNESS
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 She states that her voice has improved since the surgery. She states that she has not been smoking.
course crackles

## 2024-08-16 ENCOUNTER — APPOINTMENT (OUTPATIENT)
Dept: INTERNAL MEDICINE | Facility: CLINIC | Age: 64
End: 2024-08-16
Payer: COMMERCIAL

## 2024-08-16 VITALS
RESPIRATION RATE: 14 BRPM | TEMPERATURE: 98 F | HEART RATE: 81 BPM | DIASTOLIC BLOOD PRESSURE: 68 MMHG | SYSTOLIC BLOOD PRESSURE: 136 MMHG | HEIGHT: 64 IN | BODY MASS INDEX: 34.53 KG/M2 | OXYGEN SATURATION: 98 % | WEIGHT: 202.25 LBS

## 2024-08-16 DIAGNOSIS — R92.8 OTHER ABNORMAL AND INCONCLUSIVE FINDINGS ON DIAGNOSTIC IMAGING OF BREAST: ICD-10-CM

## 2024-08-16 DIAGNOSIS — E78.1 PURE HYPERGLYCERIDEMIA: ICD-10-CM

## 2024-08-16 DIAGNOSIS — R93.89 ABNORMAL FINDINGS ON DIAGNOSTIC IMAGING OF OTHER SPECIFIED BODY STRUCTURES: ICD-10-CM

## 2024-08-16 DIAGNOSIS — I10 ESSENTIAL (PRIMARY) HYPERTENSION: ICD-10-CM

## 2024-08-16 DIAGNOSIS — F17.200 NICOTINE DEPENDENCE, UNSPECIFIED, UNCOMPLICATED: ICD-10-CM

## 2024-08-16 DIAGNOSIS — E78.00 PURE HYPERCHOLESTEROLEMIA, UNSPECIFIED: ICD-10-CM

## 2024-08-16 DIAGNOSIS — R73.9 HYPERGLYCEMIA, UNSPECIFIED: ICD-10-CM

## 2024-08-16 DIAGNOSIS — Z79.899 OTHER LONG TERM (CURRENT) DRUG THERAPY: ICD-10-CM

## 2024-08-16 DIAGNOSIS — E78.5 HYPERLIPIDEMIA, UNSPECIFIED: ICD-10-CM

## 2024-08-16 PROCEDURE — 99214 OFFICE O/P EST MOD 30 MIN: CPT

## 2024-08-16 PROCEDURE — G2211 COMPLEX E/M VISIT ADD ON: CPT | Mod: NC

## 2024-08-17 PROBLEM — Z79.899 MEDICATION MANAGEMENT: Status: ACTIVE | Noted: 2024-08-15

## 2024-08-17 PROBLEM — R93.89 THICKENED ENDOMETRIUM: Status: ACTIVE | Noted: 2024-08-17

## 2024-08-17 NOTE — PHYSICAL EXAM
[Normal TMs] : both tympanic membranes were normal [Normal Nasal Mucosa] : the nasal mucosa was normal [Normal Supraclavicular Nodes] : no supraclavicular lymphadenopathy [No Skin Lesions] : no skin lesions [Speech Grossly Normal] : speech grossly normal [Memory Grossly Normal] : memory grossly normal [Normal Affect] : the affect was normal [Alert and Oriented x3] : oriented to person, place, and time [Normal Mood] : the mood was normal [Normal Insight/Judgement] : insight and judgment were intact [Normal] : affect was normal and insight and judgment were intact [Normal Posterior Cervical Nodes] : no posterior cervical lymphadenopathy [Normal Anterior Cervical Nodes] : no anterior cervical lymphadenopathy [Kyphosis] : no kyphosis [Scoliosis] : no scoliosis [Acne] : no acne [de-identified] : obese

## 2024-08-17 NOTE — PLAN
[FreeTextEntry1] : Cardiology 1. HTN-stable on Amlodipine 5 mg qd, continue low sodium diet, weight loss recommended 2. hyperlipidemia-continue with Rosuvastatin 20 mg qd, c/w low cholesterol diet 3. hypertriglyceridemia-low carbohydrate/cholesterol diet, continue to monitor continue cardiology follow up with Dr. Quintanilla-consider vascular evaluation with carotid dopplers, CT heart/calcium score, lower extremity arterial dopplers in light of CV risk factors especially long smoking history Endocrinology 1. hyperglycemia-recommend low carbohydrate diet and increasing physical activity-consider starting GLP-1 agonist and or Metformin in the future depending on glycemic control 2. obesity-as above 3. Screening for osteoporosis-RX given for DEXA scan with FRAX score, start calcium with vitamin D 7362-9355 mg qd, discussed weight bearing exercise like walking and its benefits in increasing bone mineral density Pulmonary current smoker-40 pack years-discussed smoking cessation, patient recently quit smoking but slowly returned and is smoking approximately 5 cigarettes daily-discussed chantix, she will f/u when ready Otolaryngology 1. hoarseness-larynx polyp-f/u with Dr. Sepulveda for second surgery to completely remove laryngeal polyp Gynecology 1. left sided core bx c/w granulomatous inflammation-report faxed to Dr. Kerr 2. post menopausal vaginal bleeding/thickened endometrial lining-scheduled for D&C Gastroenterology 1. colon cancer screening-referred to GI for consultation prior to colonoscopy, FIT test kit given Health Care Maintenence discussed importance of updating vaccines including shingles, pneumonia, influenza, tetanus etc  Follow up in 4 months for fasting blood work RX given for follow up blood work

## 2024-08-17 NOTE — HISTORY OF PRESENT ILLNESS
[Spouse] : spouse [FreeTextEntry1] : follow up of chronic medical issues [de-identified] : 64 year old female PMH as below presents for general follow up of active medical issues.  She is taking amlodipine as prescribed and denies dizziness or lightheadedness.  She is taking rosuvastatin 20 mg qd and denies diffuse myalgias/arthralgias.  She has gone for mammogram which lead to US guided left core breast biopsy which revealed granulomatous inflammation.  She also saw otolaryngologist, Dr. Sepulveda for hoarseness which lead to surgical removal of large vocal cord polyp.  She will require an additional procedure to completely remove the remainder of the polyp.  Her hoarseness is improving.  She saw GYN, Dr. Kirk Kerr, in consultation for post menopausal vaginal bleeding.  Pelvic US reportedly noted thickened endometrial lining which will require D&C scheduled for 9/6/24.  Patient is not fasting for blood work today.

## 2024-08-23 ENCOUNTER — APPOINTMENT (OUTPATIENT)
Dept: OTOLARYNGOLOGY | Facility: CLINIC | Age: 64
End: 2024-08-23
Payer: COMMERCIAL

## 2024-08-23 VITALS
WEIGHT: 202 LBS | BODY MASS INDEX: 34.49 KG/M2 | HEIGHT: 64 IN | HEART RATE: 83 BPM | DIASTOLIC BLOOD PRESSURE: 77 MMHG | SYSTOLIC BLOOD PRESSURE: 132 MMHG

## 2024-08-23 DIAGNOSIS — J38.1 POLYP OF VOCAL CORD AND LARYNX: ICD-10-CM

## 2024-08-23 DIAGNOSIS — J34.2 DEVIATED NASAL SEPTUM: ICD-10-CM

## 2024-08-23 DIAGNOSIS — R49.0 DYSPHONIA: ICD-10-CM

## 2024-08-23 DIAGNOSIS — J38.3 OTHER DISEASES OF VOCAL CORDS: ICD-10-CM

## 2024-08-23 DIAGNOSIS — E66.3 OVERWEIGHT: ICD-10-CM

## 2024-08-23 DIAGNOSIS — F17.200 NICOTINE DEPENDENCE, UNSPECIFIED, UNCOMPLICATED: ICD-10-CM

## 2024-08-23 DIAGNOSIS — J31.0 CHRONIC RHINITIS: ICD-10-CM

## 2024-08-23 DIAGNOSIS — E66.9 OVERWEIGHT: ICD-10-CM

## 2024-08-23 PROCEDURE — 99214 OFFICE O/P EST MOD 30 MIN: CPT | Mod: 25

## 2024-08-23 PROCEDURE — 31575 DIAGNOSTIC LARYNGOSCOPY: CPT

## 2024-08-23 NOTE — PROCEDURE
[Hoarseness] : hoarseness not clearly evaluated by indirect laryngoscopy [Complicated Symptoms] : complicated symptoms requiring more thorough examination than provided by mirror [de-identified] :  Procedure: Flexible Fiberoptic Laryngoscopy: Risks, benefits, and alternatives of flexible endoscopy were explained to the patient. The patient gave oral consent to proceed. The flexible scope was inserted into the right nasal cavity and advanced towards the nasopharynx. Visualized mucosa over the turbinates and septum were as described above. The nasopharynx was clear. Oropharyngeal walls were symmetric and mobile without lesion, mass, or edema. Hypopharynx was also without lesion or edema. Larynx was mobile without lesions. Supraglottic structures were free of edema, mass, and asymmetry. True vocal folds were white without mass, but there is a large pedunculated lesion on the left vocal cord. Base of tongue was within normal limits. -right vocal cord appears normal -large pedunculated lesion on the left vocal cord

## 2024-08-23 NOTE — ASSESSMENT
[FreeTextEntry1] : Reviewed and reconciled medications, allergies, PMHx, PSHx, SocHx, FMHx.  Patient with h/o snoring and Timi's edema with hoarseness s/p direct suspension microlaryngoscopy with excision of right vocal cord mass and then bilateral vocal cord injections with Depo-Medrol 7/16/24 presents today for a follow up. She states that she is still smoking cigarettes. She states that her breathing has been fine.  Pathology 7/16/24: -being appearing vocal cord polyp   Physical exam: -deviated septum right -mildly inflamed turbinates  ENT Procedure: Flexible laryngoscopy -right vocal cord appears normal -large pedunculated lesion on the left vocal cord   Plan: -Try to stop smoking (Discussed risk of recurrence and malignancy with smoking) -Discussed r/b/a of left vocal cord mass excision with vocal cord injections -FU after surgery (in January)

## 2024-08-23 NOTE — HISTORY OF PRESENT ILLNESS
[de-identified] : Patient with h/o snoring and Timi's edema with hoarseness s/p direct suspension microlaryngoscopy with excision of right vocal cord mass and then bilateral vocal cord injections with Depo-Medrol 7/16/24 presents today for a follow up. She states that she is still smoking cigarettes. She states that her breathing has been fine.

## 2024-08-23 NOTE — ADDENDUM
[FreeTextEntry1] :  Documented by Jaret Barrera acting as scribe for Dr. Sepulveda on 08/23/2024. All Medical record entries made by the Scribe were at my, Dr. Sepulveda, direction and personally dictated by me on 08/23/2024 . I have reviewed the chart and agree that the record accurately reflects my personal performance of the history, physical exam, assessment and plan. I have also personally directed, reviewed, and agreed with the discharge instructions.

## 2024-09-19 ENCOUNTER — NON-APPOINTMENT (OUTPATIENT)
Age: 64
End: 2024-09-19

## 2024-09-19 PROBLEM — C54.1 ENDOMETRIAL CANCER: Status: ACTIVE | Noted: 2024-09-19

## 2024-09-20 ENCOUNTER — APPOINTMENT (OUTPATIENT)
Dept: GYNECOLOGIC ONCOLOGY | Facility: CLINIC | Age: 64
End: 2024-09-20
Payer: COMMERCIAL

## 2024-09-20 VITALS
TEMPERATURE: 97.2 F | WEIGHT: 201 LBS | HEIGHT: 64 IN | DIASTOLIC BLOOD PRESSURE: 79 MMHG | BODY MASS INDEX: 34.31 KG/M2 | OXYGEN SATURATION: 95 % | HEART RATE: 88 BPM | SYSTOLIC BLOOD PRESSURE: 127 MMHG

## 2024-09-20 DIAGNOSIS — C54.1 MALIGNANT NEOPLASM OF ENDOMETRIUM: ICD-10-CM

## 2024-09-20 PROCEDURE — 99205 OFFICE O/P NEW HI 60 MIN: CPT

## 2024-09-20 PROCEDURE — 99459 PELVIC EXAMINATION: CPT

## 2024-09-22 NOTE — PHYSICAL EXAM
[Chaperone Present] : A chaperone was present in the examining room during all aspects of the physical examination [82742] : A chaperone was present during the pelvic exam. [Abnormal] : Examination of extremities for edema and/or varicosities: Abnormal [Absent] : CVAT: absent [Normal] : Recto-Vaginal Exam: Normal [FreeTextEntry2] : Claire VALENTIN [de-identified] : 1+ edema [de-identified] : The uterus was nonpalpable [de-identified] : The adnexae were nonpalpable

## 2024-09-22 NOTE — PHYSICAL EXAM
[Chaperone Present] : A chaperone was present in the examining room during all aspects of the physical examination [[id number]] : A chaperone was present during the pelvic exam. [Abnormal] : Examination of extremities for edema and/or varicosities: Abnormal [Absent] : CVAT: absent [Normal] : Recto-Vaginal Exam: Normal [FreeTextEntry2] : Claire VALENTIN [de-identified] : 1+ edema [de-identified] : The uterus was nonpalpable [de-identified] : The adnexae were nonpalpable

## 2024-09-22 NOTE — HISTORY OF PRESENT ILLNESS
[FreeTextEntry1] : Referred by (GYN) Dr. Kirk Kerr PCP: Dr Addis Quintanilla   Ms. BAUMAN is a 64 year old  female, referred from Dr. Kerr, for postmenopausal bleeding, with a new diagnosis of endometrial cancer.  She reports a sono; no report; pt will have forwarded. She reports a h/o fibroid.   She denies pelvic pain/pressure. She denies bloating, abdominal distention or change in bowel or urinary habits.  She denies recent weight changes.  She denies nausea/vomiting.  She has vaginal bleeding, but denies vaginal discharge.  She denies all other associated signs and symptoms.  She is here today to discuss further surgical management.   2024 Endometrial Biopsy- grade 1 endometrial cancer   loss of pms2 and loss of mlh1  PMHx- obesity, HLD, HTN PSH- fallopian tube removal, D&C, cataract SH: 40 y 1PPD tob use  HM Colonoscopy

## 2024-09-22 NOTE — REVIEW OF SYSTEMS
[Negative] : Musculoskeletal [Abn Vag Bleeding] : abnormal vaginal bleeding [FreeTextEntry4] : See HPI

## 2024-09-22 NOTE — DISCUSSION/SUMMARY
[Reviewed Clinical Lab Test(s)] : Results of clinical tests were reviewed. [Reviewed Radiology Report(s)] : Radiology reports were reviewed. [FreeTextEntry1] : I met with the pt. -We discussed the clinical findings and nature of EM Ca. -We await the methylation promoter assay for further disposition. I have req the results from [facility].  -Management options were reviewed, including my advice for a TH, BSO, LNS. We disc the use of a Jin platform with poss conversion to an open procedure. Disc poss role of cysto. The pot need for adj therapy was disc.  -Periop and recuperative issues were discussed, as were the possible hazards and risks of surgery, including but not limited to infection, thromboembolic disease and its life-threatening nature, transfusion, and surrounding organ injury (urinary, bowel, vascular, and neural), incision issues. -A diagram was drawn, and a copy provided. -I advised a clearance. -I asked her to obtain rec from her ENT surg as to proceeding, part in the context of her needing additional procedure, which she notes.  -I advised CCS preop and explained my rationale.  -Her instructions were reviewed. -All Q/A. -She will contact  to schedule. Orders in. -We disc the poss of a f/u disc (RP or TH).  -Comm with  re consult.  -Effort for the visit includes the note prep, review of prior material, interview, exam, further documentation, and coordination of care. -Letter TF.

## 2024-10-01 ENCOUNTER — NON-APPOINTMENT (OUTPATIENT)
Age: 64
End: 2024-10-01

## 2024-10-04 ENCOUNTER — OUTPATIENT (OUTPATIENT)
Dept: OUTPATIENT SERVICES | Facility: HOSPITAL | Age: 64
LOS: 1 days | End: 2024-10-04
Payer: COMMERCIAL

## 2024-10-04 ENCOUNTER — APPOINTMENT (OUTPATIENT)
Dept: CT IMAGING | Facility: CLINIC | Age: 64
End: 2024-10-04
Payer: COMMERCIAL

## 2024-10-04 DIAGNOSIS — Z00.8 ENCOUNTER FOR OTHER GENERAL EXAMINATION: ICD-10-CM

## 2024-10-04 DIAGNOSIS — C54.1 MALIGNANT NEOPLASM OF ENDOMETRIUM: ICD-10-CM

## 2024-10-04 PROCEDURE — 74177 CT ABD & PELVIS W/CONTRAST: CPT

## 2024-10-04 PROCEDURE — 74177 CT ABD & PELVIS W/CONTRAST: CPT | Mod: 26

## 2024-10-07 ENCOUNTER — NON-APPOINTMENT (OUTPATIENT)
Age: 64
End: 2024-10-07

## 2024-10-10 ENCOUNTER — NON-APPOINTMENT (OUTPATIENT)
Age: 64
End: 2024-10-10

## 2024-10-10 ENCOUNTER — APPOINTMENT (OUTPATIENT)
Dept: CARDIOLOGY | Facility: CLINIC | Age: 64
End: 2024-10-10
Payer: COMMERCIAL

## 2024-10-10 VITALS
WEIGHT: 198 LBS | HEART RATE: 82 BPM | OXYGEN SATURATION: 96 % | DIASTOLIC BLOOD PRESSURE: 79 MMHG | BODY MASS INDEX: 33.99 KG/M2 | SYSTOLIC BLOOD PRESSURE: 128 MMHG

## 2024-10-10 DIAGNOSIS — E78.5 HYPERLIPIDEMIA, UNSPECIFIED: ICD-10-CM

## 2024-10-10 PROCEDURE — 93000 ELECTROCARDIOGRAM COMPLETE: CPT

## 2024-10-10 PROCEDURE — G2211 COMPLEX E/M VISIT ADD ON: CPT | Mod: NC

## 2024-10-10 PROCEDURE — 99214 OFFICE O/P EST MOD 30 MIN: CPT

## 2024-10-11 ENCOUNTER — APPOINTMENT (OUTPATIENT)
Dept: INTERNAL MEDICINE | Facility: CLINIC | Age: 64
End: 2024-10-11
Payer: COMMERCIAL

## 2024-10-11 VITALS
HEIGHT: 64 IN | WEIGHT: 198 LBS | DIASTOLIC BLOOD PRESSURE: 82 MMHG | OXYGEN SATURATION: 97 % | TEMPERATURE: 98.5 F | BODY MASS INDEX: 33.8 KG/M2 | SYSTOLIC BLOOD PRESSURE: 130 MMHG | HEART RATE: 78 BPM

## 2024-10-11 DIAGNOSIS — C54.1 MALIGNANT NEOPLASM OF ENDOMETRIUM: ICD-10-CM

## 2024-10-11 DIAGNOSIS — Z01.818 ENCOUNTER FOR OTHER PREPROCEDURAL EXAMINATION: ICD-10-CM

## 2024-10-11 DIAGNOSIS — E66.3 OVERWEIGHT: ICD-10-CM

## 2024-10-11 DIAGNOSIS — E66.9 OVERWEIGHT: ICD-10-CM

## 2024-10-11 DIAGNOSIS — I10 ESSENTIAL (PRIMARY) HYPERTENSION: ICD-10-CM

## 2024-10-11 DIAGNOSIS — F17.200 NICOTINE DEPENDENCE, UNSPECIFIED, UNCOMPLICATED: ICD-10-CM

## 2024-10-11 PROCEDURE — G2211 COMPLEX E/M VISIT ADD ON: CPT | Mod: NC

## 2024-10-11 PROCEDURE — 93000 ELECTROCARDIOGRAM COMPLETE: CPT

## 2024-10-11 PROCEDURE — 99214 OFFICE O/P EST MOD 30 MIN: CPT

## 2024-10-13 PROBLEM — N28.89 RENAL MASS, LEFT: Status: ACTIVE | Noted: 2024-10-13

## 2024-10-13 PROBLEM — Z01.818 PRE-OPERATIVE CLEARANCE: Status: ACTIVE | Noted: 2024-10-10

## 2024-10-17 ENCOUNTER — RX RENEWAL (OUTPATIENT)
Age: 64
End: 2024-10-17

## 2024-10-18 ENCOUNTER — APPOINTMENT (OUTPATIENT)
Dept: UROLOGY | Facility: CLINIC | Age: 64
End: 2024-10-18
Payer: COMMERCIAL

## 2024-10-18 ENCOUNTER — OUTPATIENT (OUTPATIENT)
Dept: OUTPATIENT SERVICES | Facility: HOSPITAL | Age: 64
LOS: 1 days | End: 2024-10-18

## 2024-10-18 VITALS
OXYGEN SATURATION: 98 % | SYSTOLIC BLOOD PRESSURE: 129 MMHG | RESPIRATION RATE: 16 BRPM | WEIGHT: 196 LBS | DIASTOLIC BLOOD PRESSURE: 80 MMHG | HEIGHT: 64 IN | HEART RATE: 81 BPM | BODY MASS INDEX: 33.46 KG/M2

## 2024-10-18 VITALS
RESPIRATION RATE: 16 BRPM | HEIGHT: 64 IN | TEMPERATURE: 98 F | OXYGEN SATURATION: 97 % | DIASTOLIC BLOOD PRESSURE: 77 MMHG | WEIGHT: 195.99 LBS | HEART RATE: 87 BPM | SYSTOLIC BLOOD PRESSURE: 124 MMHG

## 2024-10-18 DIAGNOSIS — R68.89 OTHER GENERAL SYMPTOMS AND SIGNS: ICD-10-CM

## 2024-10-18 DIAGNOSIS — N28.89 OTHER SPECIFIED DISORDERS OF KIDNEY AND URETER: ICD-10-CM

## 2024-10-18 DIAGNOSIS — Z98.41 CATARACT EXTRACTION STATUS, RIGHT EYE: Chronic | ICD-10-CM

## 2024-10-18 DIAGNOSIS — C54.1 MALIGNANT NEOPLASM OF ENDOMETRIUM: ICD-10-CM

## 2024-10-18 DIAGNOSIS — Z98.51 TUBAL LIGATION STATUS: Chronic | ICD-10-CM

## 2024-10-18 DIAGNOSIS — Z86.018 PERSONAL HISTORY OF OTHER BENIGN NEOPLASM: Chronic | ICD-10-CM

## 2024-10-18 DIAGNOSIS — I10 ESSENTIAL (PRIMARY) HYPERTENSION: ICD-10-CM

## 2024-10-18 LAB
A1C WITH ESTIMATED AVERAGE GLUCOSE RESULT: 5.7 % — HIGH (ref 4–5.6)
ANION GAP SERPL CALC-SCNC: 14 MMOL/L — SIGNIFICANT CHANGE UP (ref 7–14)
APPEARANCE UR: CLEAR — SIGNIFICANT CHANGE UP
BACTERIA # UR AUTO: NEGATIVE /HPF — SIGNIFICANT CHANGE UP
BASOPHILS # BLD AUTO: 0.04 K/UL — SIGNIFICANT CHANGE UP (ref 0–0.2)
BASOPHILS NFR BLD AUTO: 0.5 % — SIGNIFICANT CHANGE UP (ref 0–2)
BILIRUB UR-MCNC: NEGATIVE — SIGNIFICANT CHANGE UP
BLD GP AB SCN SERPL QL: NEGATIVE — SIGNIFICANT CHANGE UP
BUN SERPL-MCNC: 16 MG/DL — SIGNIFICANT CHANGE UP (ref 7–23)
CALCIUM SERPL-MCNC: 9.3 MG/DL — SIGNIFICANT CHANGE UP (ref 8.4–10.5)
CAST: 0 /LPF — SIGNIFICANT CHANGE UP (ref 0–4)
CHLORIDE SERPL-SCNC: 104 MMOL/L — SIGNIFICANT CHANGE UP (ref 98–107)
CO2 SERPL-SCNC: 24 MMOL/L — SIGNIFICANT CHANGE UP (ref 22–31)
COLOR SPEC: YELLOW — SIGNIFICANT CHANGE UP
CREAT SERPL-MCNC: 0.81 MG/DL — SIGNIFICANT CHANGE UP (ref 0.5–1.3)
DIFF PNL FLD: ABNORMAL
EGFR: 82 ML/MIN/1.73M2 — SIGNIFICANT CHANGE UP
EOSINOPHIL # BLD AUTO: 0.12 K/UL — SIGNIFICANT CHANGE UP (ref 0–0.5)
EOSINOPHIL NFR BLD AUTO: 1.6 % — SIGNIFICANT CHANGE UP (ref 0–6)
ESTIMATED AVERAGE GLUCOSE: 117 — SIGNIFICANT CHANGE UP
GLUCOSE SERPL-MCNC: 107 MG/DL — HIGH (ref 70–99)
GLUCOSE UR QL: NEGATIVE MG/DL — SIGNIFICANT CHANGE UP
HCT VFR BLD CALC: 43.6 % — SIGNIFICANT CHANGE UP (ref 34.5–45)
HGB BLD-MCNC: 14.3 G/DL — SIGNIFICANT CHANGE UP (ref 11.5–15.5)
IMM GRANULOCYTES NFR BLD AUTO: 0.3 % — SIGNIFICANT CHANGE UP (ref 0–0.9)
KETONES UR-MCNC: NEGATIVE MG/DL — SIGNIFICANT CHANGE UP
LEUKOCYTE ESTERASE UR-ACNC: ABNORMAL
LYMPHOCYTES # BLD AUTO: 1.96 K/UL — SIGNIFICANT CHANGE UP (ref 1–3.3)
LYMPHOCYTES # BLD AUTO: 26.2 % — SIGNIFICANT CHANGE UP (ref 13–44)
MCHC RBC-ENTMCNC: 28 PG — SIGNIFICANT CHANGE UP (ref 27–34)
MCHC RBC-ENTMCNC: 32.8 GM/DL — SIGNIFICANT CHANGE UP (ref 32–36)
MCV RBC AUTO: 85.3 FL — SIGNIFICANT CHANGE UP (ref 80–100)
MONOCYTES # BLD AUTO: 0.48 K/UL — SIGNIFICANT CHANGE UP (ref 0–0.9)
MONOCYTES NFR BLD AUTO: 6.4 % — SIGNIFICANT CHANGE UP (ref 2–14)
NEUTROPHILS # BLD AUTO: 4.86 K/UL — SIGNIFICANT CHANGE UP (ref 1.8–7.4)
NEUTROPHILS NFR BLD AUTO: 65 % — SIGNIFICANT CHANGE UP (ref 43–77)
NITRITE UR-MCNC: NEGATIVE — SIGNIFICANT CHANGE UP
PH UR: 6.5 — SIGNIFICANT CHANGE UP (ref 5–8)
PLATELET # BLD AUTO: 256 K/UL — SIGNIFICANT CHANGE UP (ref 150–400)
POTASSIUM SERPL-MCNC: 3.3 MMOL/L — LOW (ref 3.5–5.3)
POTASSIUM SERPL-SCNC: 3.3 MMOL/L — LOW (ref 3.5–5.3)
PROT UR-MCNC: NEGATIVE MG/DL — SIGNIFICANT CHANGE UP
RBC # BLD: 5.11 M/UL — SIGNIFICANT CHANGE UP (ref 3.8–5.2)
RBC # FLD: 12.7 % — SIGNIFICANT CHANGE UP (ref 10.3–14.5)
RBC CASTS # UR COMP ASSIST: 30 /HPF — HIGH (ref 0–4)
RH IG SCN BLD-IMP: POSITIVE — SIGNIFICANT CHANGE UP
RH IG SCN BLD-IMP: POSITIVE — SIGNIFICANT CHANGE UP
SODIUM SERPL-SCNC: 142 MMOL/L — SIGNIFICANT CHANGE UP (ref 135–145)
SP GR SPEC: 1.02 — SIGNIFICANT CHANGE UP (ref 1–1.03)
SQUAMOUS # UR AUTO: 4 /HPF — SIGNIFICANT CHANGE UP (ref 0–5)
UROBILINOGEN FLD QL: 0.2 MG/DL — SIGNIFICANT CHANGE UP (ref 0.2–1)
WBC # BLD: 7.48 K/UL — SIGNIFICANT CHANGE UP (ref 3.8–10.5)
WBC # FLD AUTO: 7.48 K/UL — SIGNIFICANT CHANGE UP (ref 3.8–10.5)
WBC UR QL: 3 /HPF — SIGNIFICANT CHANGE UP (ref 0–5)

## 2024-10-18 PROCEDURE — 99204 OFFICE O/P NEW MOD 45 MIN: CPT

## 2024-10-18 RX ORDER — CHLORHEXIDINE GLUCONATE 40 MG/ML
1 SOLUTION TOPICAL DAILY
Refills: 0 | Status: DISCONTINUED | OUTPATIENT
Start: 2024-10-24 | End: 2024-10-24

## 2024-10-18 NOTE — H&P PST ADULT - BP NONINVASIVE MEAN (MM HG)
There is a form to be completed that I got on Thursday 6/11/20, I will be at the facility tomorrow and will write the note with the required information in the notes at [facility], once this is completed we should be able to put through the order. Due to COVID to limit exposure to my patients I am limiting my visits to once/week, thanks for patience.    92

## 2024-10-18 NOTE — H&P PST ADULT - ASSESSMENT
63 yo female scheduled for robotic-assisted, total laparoscopic hysterectomy, bilateral salpingo-oophorectomy, lymph node sampling, possible exploratory laparotomy, cystoscopy with Dr. Sanches.

## 2024-10-18 NOTE — H&P PST ADULT - HISTORY OF PRESENT ILLNESS
65 yo female presents to PST unit with pre-op diagnosis of  endometrial cancer  scheduled for robotic-assisted, total laparoscopic hysterectomy, bilateral salpingo-oophorectomy, lymph node sampling, possible exploratory laparotomy, cystoscopy with Dr. Sanches. Per pt. Dr. Vaughn to perform left partial nephrectomy for left renal mass discovered on CT scan.

## 2024-10-18 NOTE — H&P PST ADULT - GENITOURINARY COMMENTS
During workup for endometrial CA 10 cm left renal mass discovered. postmenopausal bleeding  x 6 months

## 2024-10-18 NOTE — H&P PST ADULT - PROBLEM SELECTOR PLAN 3
Per ENT pt should receive 10-12 of prednisone immediately prior to intubation. If extubated after surgery, no other intervention. If remains intubated 8mg every 6 hours.

## 2024-10-18 NOTE — H&P PST ADULT - PROBLEM SELECTOR PLAN 1
scheduled for robotic-assisted, total laparoscopic hysterectomy, bilateral salpingo-oophorectomy, lymph node sampling, possible exploratory laparotomy, cystoscopy with Dr. Sanches on 10/24/24.   Verbal and written pre-op instructions provided to patient. Patient verbalized understanding and will call surgeons office for revised instructions if surgery is rescheduled.  Pepcid for GI prophylaxis provided.   patient given verbal and written instruction with teach back on chlorhexidine shampoo, and the patient verbalized understanding with return demonstration.  Medical eval printed in chart.

## 2024-10-18 NOTE — H&P PST ADULT - NSICDXPASTSURGICALHX_GEN_ALL_CORE_FT
PAST SURGICAL HISTORY:  H/O benign neoplasm of larynx     H/O bilateral cataract extraction     History of bilateral tubal ligation      on unit

## 2024-10-18 NOTE — H&P PST ADULT - ATTENDING COMMENTS
Seen in ASU. She reports no changes. Planned procedure reviewed, incl EUA by learner, dil of cx, injection of mapping dye, placement of manipulator, robotic total hysterectomy, BSO, LNS, poss other bx, poss conversion to an open procedure (VI), poss cysto. Consent form reviewed. All Q/A. Case d/w Anesth attd and .

## 2024-10-18 NOTE — H&P PST ADULT - ENMT COMMENTS
complete dentures. History of lesion of left vocal cord. h/o snoring and Timi's edema with hoarseness s/p direct suspension microlaryngoscopy with excision of right vocal cord mass and then bilateral vocal cord injections with Depo-Medrol 7/16/24 presents today for a follow up. complete dentures. History of lesion of left vocal cord. h/o snoring and Timi's edema with hoarseness s/p direct suspension microlaryngoscopy with excision of right vocal cord mass and then bilateral vocal cord injections with Depo-Medrol 7/16/24

## 2024-10-19 LAB
CULTURE RESULTS: SIGNIFICANT CHANGE UP
SPECIMEN SOURCE: SIGNIFICANT CHANGE UP

## 2024-10-21 ENCOUNTER — OUTPATIENT (OUTPATIENT)
Dept: OUTPATIENT SERVICES | Facility: HOSPITAL | Age: 64
LOS: 1 days | End: 2024-10-21
Payer: COMMERCIAL

## 2024-10-21 ENCOUNTER — APPOINTMENT (OUTPATIENT)
Dept: CT IMAGING | Facility: CLINIC | Age: 64
End: 2024-10-21
Payer: COMMERCIAL

## 2024-10-21 DIAGNOSIS — N28.89 OTHER SPECIFIED DISORDERS OF KIDNEY AND URETER: ICD-10-CM

## 2024-10-21 DIAGNOSIS — Z98.51 TUBAL LIGATION STATUS: Chronic | ICD-10-CM

## 2024-10-21 DIAGNOSIS — Z86.018 PERSONAL HISTORY OF OTHER BENIGN NEOPLASM: Chronic | ICD-10-CM

## 2024-10-21 PROCEDURE — 71250 CT THORAX DX C-: CPT | Mod: 26

## 2024-10-21 PROCEDURE — 71250 CT THORAX DX C-: CPT

## 2024-10-23 NOTE — ASU PATIENT PROFILE, ADULT - NSICDXPASTSURGICALHX_GEN_ALL_CORE_FT
PAST SURGICAL HISTORY:  H/O benign neoplasm of larynx     H/O bilateral cataract extraction     History of bilateral tubal ligation

## 2024-10-24 ENCOUNTER — APPOINTMENT (OUTPATIENT)
Dept: GYNECOLOGIC ONCOLOGY | Facility: HOSPITAL | Age: 64
End: 2024-10-24

## 2024-10-24 ENCOUNTER — TRANSCRIPTION ENCOUNTER (OUTPATIENT)
Age: 64
End: 2024-10-24

## 2024-10-24 ENCOUNTER — APPOINTMENT (OUTPATIENT)
Dept: UROLOGY | Facility: HOSPITAL | Age: 64
End: 2024-10-24

## 2024-10-24 ENCOUNTER — INPATIENT (INPATIENT)
Facility: HOSPITAL | Age: 64
LOS: 0 days | Discharge: HOME | End: 2024-10-25
Attending: OBSTETRICS & GYNECOLOGY | Admitting: OBSTETRICS & GYNECOLOGY
Payer: COMMERCIAL

## 2024-10-24 VITALS
HEIGHT: 64 IN | DIASTOLIC BLOOD PRESSURE: 72 MMHG | HEART RATE: 78 BPM | SYSTOLIC BLOOD PRESSURE: 138 MMHG | RESPIRATION RATE: 18 BRPM | WEIGHT: 195.99 LBS | OXYGEN SATURATION: 100 % | TEMPERATURE: 98 F

## 2024-10-24 DIAGNOSIS — Z98.51 TUBAL LIGATION STATUS: Chronic | ICD-10-CM

## 2024-10-24 DIAGNOSIS — Z98.41 CATARACT EXTRACTION STATUS, RIGHT EYE: Chronic | ICD-10-CM

## 2024-10-24 DIAGNOSIS — Z86.018 PERSONAL HISTORY OF OTHER BENIGN NEOPLASM: Chronic | ICD-10-CM

## 2024-10-24 DIAGNOSIS — C54.1 MALIGNANT NEOPLASM OF ENDOMETRIUM: ICD-10-CM

## 2024-10-24 LAB
ANION GAP SERPL CALC-SCNC: 12 MMOL/L — SIGNIFICANT CHANGE UP (ref 7–14)
BASOPHILS # BLD AUTO: 0.04 K/UL — SIGNIFICANT CHANGE UP (ref 0–0.2)
BASOPHILS NFR BLD AUTO: 0.2 % — SIGNIFICANT CHANGE UP (ref 0–2)
BUN SERPL-MCNC: 13 MG/DL — SIGNIFICANT CHANGE UP (ref 7–23)
CALCIUM SERPL-MCNC: 8.7 MG/DL — SIGNIFICANT CHANGE UP (ref 8.4–10.5)
CHLORIDE SERPL-SCNC: 105 MMOL/L — SIGNIFICANT CHANGE UP (ref 98–107)
CO2 SERPL-SCNC: 23 MMOL/L — SIGNIFICANT CHANGE UP (ref 22–31)
CREAT SERPL-MCNC: 1.05 MG/DL — SIGNIFICANT CHANGE UP (ref 0.5–1.3)
EGFR: 60 ML/MIN/1.73M2 — SIGNIFICANT CHANGE UP
EOSINOPHIL # BLD AUTO: 0 K/UL — SIGNIFICANT CHANGE UP (ref 0–0.5)
EOSINOPHIL NFR BLD AUTO: 0 % — SIGNIFICANT CHANGE UP (ref 0–6)
GAS PNL BLDA: SIGNIFICANT CHANGE UP
GLUCOSE BLDC GLUCOMTR-MCNC: 93 MG/DL — SIGNIFICANT CHANGE UP (ref 70–99)
GLUCOSE SERPL-MCNC: 145 MG/DL — HIGH (ref 70–99)
HCT VFR BLD CALC: 40.7 % — SIGNIFICANT CHANGE UP (ref 34.5–45)
HGB BLD-MCNC: 13.1 G/DL — SIGNIFICANT CHANGE UP (ref 11.5–15.5)
IANC: 16.11 K/UL — HIGH (ref 1.8–7.4)
IMM GRANULOCYTES NFR BLD AUTO: 0.5 % — SIGNIFICANT CHANGE UP (ref 0–0.9)
LYMPHOCYTES # BLD AUTO: 1 K/UL — SIGNIFICANT CHANGE UP (ref 1–3.3)
LYMPHOCYTES # BLD AUTO: 5.7 % — LOW (ref 13–44)
MAGNESIUM SERPL-MCNC: 1.7 MG/DL — SIGNIFICANT CHANGE UP (ref 1.6–2.6)
MCHC RBC-ENTMCNC: 27.4 PG — SIGNIFICANT CHANGE UP (ref 27–34)
MCHC RBC-ENTMCNC: 32.2 GM/DL — SIGNIFICANT CHANGE UP (ref 32–36)
MCV RBC AUTO: 85.1 FL — SIGNIFICANT CHANGE UP (ref 80–100)
MONOCYTES # BLD AUTO: 0.44 K/UL — SIGNIFICANT CHANGE UP (ref 0–0.9)
MONOCYTES NFR BLD AUTO: 2.5 % — SIGNIFICANT CHANGE UP (ref 2–14)
NEUTROPHILS # BLD AUTO: 16.11 K/UL — HIGH (ref 1.8–7.4)
NEUTROPHILS NFR BLD AUTO: 91.1 % — HIGH (ref 43–77)
NRBC # BLD: 0 /100 WBCS — SIGNIFICANT CHANGE UP (ref 0–0)
NRBC # FLD: 0 K/UL — SIGNIFICANT CHANGE UP (ref 0–0)
PHOSPHATE SERPL-MCNC: 3.8 MG/DL — SIGNIFICANT CHANGE UP (ref 2.5–4.5)
PLATELET # BLD AUTO: 251 K/UL — SIGNIFICANT CHANGE UP (ref 150–400)
POTASSIUM SERPL-MCNC: 4.1 MMOL/L — SIGNIFICANT CHANGE UP (ref 3.5–5.3)
POTASSIUM SERPL-SCNC: 4.1 MMOL/L — SIGNIFICANT CHANGE UP (ref 3.5–5.3)
RBC # BLD: 4.78 M/UL — SIGNIFICANT CHANGE UP (ref 3.8–5.2)
RBC # FLD: 12.8 % — SIGNIFICANT CHANGE UP (ref 10.3–14.5)
SODIUM SERPL-SCNC: 140 MMOL/L — SIGNIFICANT CHANGE UP (ref 135–145)
WBC # BLD: 17.67 K/UL — HIGH (ref 3.8–10.5)
WBC # FLD AUTO: 17.67 K/UL — HIGH (ref 3.8–10.5)

## 2024-10-24 PROCEDURE — 88341 IMHCHEM/IMCYTCHM EA ADD ANTB: CPT | Mod: 26,XP

## 2024-10-24 PROCEDURE — 88342 IMHCHEM/IMCYTCHM 1ST ANTB: CPT | Mod: 26

## 2024-10-24 PROCEDURE — 58571 TLH W/T/O 250 G OR LESS: CPT

## 2024-10-24 PROCEDURE — 38570 LAPAROSCOPY LYMPH NODE BIOP: CPT

## 2024-10-24 PROCEDURE — 50545 LAPARO RADICAL NEPHRECTOMY: CPT | Mod: LT

## 2024-10-24 PROCEDURE — 88309 TISSUE EXAM BY PATHOLOGIST: CPT | Mod: 26

## 2024-10-24 PROCEDURE — S2900 ROBOTIC SURGICAL SYSTEM: CPT | Mod: NC

## 2024-10-24 PROCEDURE — 88307 TISSUE EXAM BY PATHOLOGIST: CPT | Mod: 26

## 2024-10-24 PROCEDURE — 88360 TUMOR IMMUNOHISTOCHEM/MANUAL: CPT | Mod: 26,1L,XP

## 2024-10-24 PROCEDURE — 38900 IO MAP OF SENT LYMPH NODE: CPT | Mod: 50

## 2024-10-24 PROCEDURE — 57800 DILATION OF CERVICAL CANAL: CPT | Mod: 59

## 2024-10-24 PROCEDURE — 88342 IMHCHEM/IMCYTCHM 1ST ANTB: CPT | Mod: 26,XP

## 2024-10-24 PROCEDURE — 88313 SPECIAL STAINS GROUP 2: CPT | Mod: 26

## 2024-10-24 PROCEDURE — 88360 TUMOR IMMUNOHISTOCHEM/MANUAL: CPT | Mod: 26,1L,59

## 2024-10-24 PROCEDURE — 88341 IMHCHEM/IMCYTCHM EA ADD ANTB: CPT | Mod: 26

## 2024-10-24 DEVICE — SURGICEL 2 X 14": Type: IMPLANTABLE DEVICE | Status: FUNCTIONAL

## 2024-10-24 DEVICE — VISTASEAL FIBRIN HUMAN 10ML: Type: IMPLANTABLE DEVICE | Status: FUNCTIONAL

## 2024-10-24 DEVICE — SURGICEL NU-KNIT 6 X 9": Type: IMPLANTABLE DEVICE | Status: FUNCTIONAL

## 2024-10-24 DEVICE — STAPLER COVIDIEN TRI-STAPLE 30MM TAN RELOAD: Type: IMPLANTABLE DEVICE | Status: FUNCTIONAL

## 2024-10-24 DEVICE — STAPLER COVIDIEN TRI-STAPLE 45MM TAN RELOAD: Type: IMPLANTABLE DEVICE | Status: FUNCTIONAL

## 2024-10-24 DEVICE — LIGATING CLIPS WECK HEMOLOK POLYMER LARGE (PURPLE) 6: Type: IMPLANTABLE DEVICE | Status: FUNCTIONAL

## 2024-10-24 RX ORDER — HEPARIN SODIUM 10000 [USP'U]/ML
5000 INJECTION INTRAVENOUS; SUBCUTANEOUS EVERY 8 HOURS
Refills: 0 | Status: DISCONTINUED | OUTPATIENT
Start: 2024-10-24 | End: 2024-10-25

## 2024-10-24 RX ORDER — OXYCODONE HYDROCHLORIDE 30 MG/1
5 TABLET ORAL EVERY 4 HOURS
Refills: 0 | Status: DISCONTINUED | OUTPATIENT
Start: 2024-10-24 | End: 2024-10-25

## 2024-10-24 RX ORDER — AMLODIPINE BESYLATE 5 MG
1 TABLET ORAL
Refills: 0 | DISCHARGE

## 2024-10-24 RX ORDER — HYDROMORPHONE HCL/0.9% NACL/PF 6 MG/30 ML
0.5 PATIENT CONTROLLED ANALGESIA SYRINGE INTRAVENOUS
Refills: 0 | Status: DISCONTINUED | OUTPATIENT
Start: 2024-10-24 | End: 2024-10-25

## 2024-10-24 RX ORDER — ONDANSETRON HYDROCHLORIDE 2 MG/ML
4 INJECTION, SOLUTION INTRAMUSCULAR; INTRAVENOUS EVERY 6 HOURS
Refills: 0 | Status: DISCONTINUED | OUTPATIENT
Start: 2024-10-24 | End: 2024-10-25

## 2024-10-24 RX ORDER — OXYCODONE HYDROCHLORIDE 30 MG/1
5 TABLET ORAL ONCE
Refills: 0 | Status: DISCONTINUED | OUTPATIENT
Start: 2024-10-24 | End: 2024-10-25

## 2024-10-24 RX ORDER — ONDANSETRON HYDROCHLORIDE 2 MG/ML
4 INJECTION, SOLUTION INTRAMUSCULAR; INTRAVENOUS ONCE
Refills: 0 | Status: DISCONTINUED | OUTPATIENT
Start: 2024-10-24 | End: 2024-10-25

## 2024-10-24 RX ORDER — MAGNESIUM SULFATE IN 0.9% NACL 2 G/50 ML
2 INTRAVENOUS SOLUTION, PIGGYBACK (ML) INTRAVENOUS ONCE
Refills: 0 | Status: COMPLETED | OUTPATIENT
Start: 2024-10-24 | End: 2024-10-24

## 2024-10-24 RX ORDER — SENNA 187 MG
2 TABLET ORAL DAILY
Refills: 0 | Status: DISCONTINUED | OUTPATIENT
Start: 2024-10-24 | End: 2024-10-25

## 2024-10-24 RX ORDER — SIMETHICONE 80 MG/1
80 TABLET, CHEWABLE ORAL EVERY 6 HOURS
Refills: 0 | Status: DISCONTINUED | OUTPATIENT
Start: 2024-10-24 | End: 2024-10-25

## 2024-10-24 RX ORDER — ACETAMINOPHEN 500 MG
1000 TABLET ORAL EVERY 6 HOURS
Refills: 0 | Status: DISCONTINUED | OUTPATIENT
Start: 2024-10-24 | End: 2024-10-25

## 2024-10-24 RX ADMIN — Medication 125 MILLILITER(S): at 21:00

## 2024-10-24 RX ADMIN — Medication 25 GRAM(S): at 21:19

## 2024-10-24 RX ADMIN — Medication 400 MILLIGRAM(S): at 23:34

## 2024-10-24 RX ADMIN — CHLORHEXIDINE GLUCONATE 1 APPLICATION(S): 40 SOLUTION TOPICAL at 09:11

## 2024-10-24 RX ADMIN — Medication 0.5 MILLIGRAM(S): at 22:00

## 2024-10-24 RX ADMIN — Medication 10 MILLIGRAM(S): at 11:09

## 2024-10-24 RX ADMIN — Medication 0.5 MILLIGRAM(S): at 21:40

## 2024-10-24 NOTE — PROGRESS NOTE ADULT - ASSESSMENT
63y/o female s/p L Lap radical nephrectomy by Urology and DMITRIY CABRERA, BSO by Gyn onc, with post-op pain, otherwise stable.     -continue aguilera  -Strict I&O's  -Analgesia prn  -Antiemetics prn  -DVT prophylaxis  -Incentive spirometry  -Clears   -OOB  -AM labs

## 2024-10-24 NOTE — PROGRESS NOTE ADULT - SUBJECTIVE AND OBJECTIVE BOX
Note    Post op Check    s/p L Lap radical nephrectomy  s/p Robot-assisted laparoscopic hysterectomy, bilateral salpingectomy with oophorectomy by Gyn-onc  EBL minimal    Patient seen and examined, reporting surgical site discomfort, denies nausea.     Vital Signs Last 24 Hrs  T(C): 36.2 (24 Oct 2024 23:34), Max: 37.1 (24 Oct 2024 19:55)  T(F): 97.1 (24 Oct 2024 23:34), Max: 98.8 (24 Oct 2024 19:55)  HR: 91 (24 Oct 2024 23:34) (78 - 91)  BP: 154/72 (24 Oct 2024 23:34) (111/58 - 154/72)  BP(mean): 81 (24 Oct 2024 22:30) (74 - 92)  RR: 18 (24 Oct 2024 23:34) (12 - 18)  SpO2: 94% (24 Oct 2024 23:34) (92% - 100%)    Parameters below as of 24 Oct 2024 23:34  Patient On (Oxygen Delivery Method): room air    I&O's Summary    24 Oct 2024 07:01  -  24 Oct 2024 23:51  --------------------------------------------------------  IN: 250 mL / OUT: 275 mL / NET: -25 mL    PHYSICAL EXAM:   Constitutional: A+O, no distress    Respiratory: clear     Cardiovascular: regular     Gastrointestinal: softly distended, minimal tenderness, dressings clean and dry    Genitourinary: aguilera in place, draining clear yellow urine, 275cc output since procedure     Extremities: venodynes in place.       LABS:               13.1   17.67 )-----------( 251      ( 24 Oct 2024 20:25 )             40.7       10-24    140  |  105  |  13  ----------------------------<  145[H]  4.1   |  23  |  1.05    Ca    8.7      24 Oct 2024 20:25  Phos  3.8     10-24  Mg     1.70     10-24

## 2024-10-24 NOTE — BRIEF OPERATIVE NOTE - SPECIMENS
1. right side pelvic lymph node 2. left side pelvic lymph node 3. uterus, cervix, bilateral fallopian tubes and ovaries

## 2024-10-24 NOTE — BRIEF OPERATIVE NOTE - NSICDXBRIEFPREOP_GEN_ALL_CORE_FT
PRE-OP DIAGNOSIS:  Endometrial cancer 24-Oct-2024 18:20:36  Sabrina Lopez   PRE-OP DIAGNOSIS:  Endometrial cancer 24-Oct-2024 18:20:36  Sabrina Lopez  Intramural uterine fibroid 24-Oct-2024 20:14:16  Jaret Sanches

## 2024-10-24 NOTE — BRIEF OPERATIVE NOTE - COMMENTS
NGR. V, SSNK.  Specimen placed in 15 endocatch bag. Removed with specimen extraction after nephrectomy. 	  Vag inspected and intact. Irrigated with water.

## 2024-10-24 NOTE — BRIEF OPERATIVE NOTE - OPERATION/FINDINGS
EUA: Normal external genitalia. Normal appearing vagina. Multiparous cervix. Approximately 10 week sized fibroid uterus palpated. Adnexa not palpable bilaterally.   LSC: Atraumatic entry site. Adhesions to the rectosigmoid colon to the left fallopian tube and ovary. Otherwise normal appearing bowel, omentum. Fibroid uterus. Fallopian tubes s/p tubal ligation. Normal ovaries bilaterally.   Cysto: No suture material noted in the bladder. Normal bladder contour. Bilateral UO jets.   Vistaseal and Nuknit applied over the surgical bed. Excellent hemostasis.  EUA: Normal external genitalia. Normal appearing vagina. Multiparous cervix. Approximately 10 week sized fibroid uterus palpated. Adnexa not palpable bilaterally.   LSC: Atraumatic entry site. Adhesions to the rectosigmoid colon mesentery to the left fallopian tube and ovary. Otherwise normal appearing bowel, omentum. Fibroid uterus. Fallopian tubes s/p tubal ligation. Normal ovaries bilaterally. Philippe sentinel LN mapping.   Cysto: No suture material noted in the bladder. Normal bladder contour. Bilateral UO brisk efflux seen.    Vistaseal and Nuknit applied over the surgical bed. Excellent hemostasis.

## 2024-10-24 NOTE — BRIEF OPERATIVE NOTE - NSICDXBRIEFPOSTOP_GEN_ALL_CORE_FT
POST-OP DIAGNOSIS:  Endometrial cancer 24-Oct-2024 18:20:42  Sabrina Lopez   POST-OP DIAGNOSIS:  Endometrial cancer 24-Oct-2024 18:20:42  Sabrina Lopez  Pelvic adhesions 24-Oct-2024 20:14:29  Jaret Sanches

## 2024-10-24 NOTE — BRIEF OPERATIVE NOTE - NSICDXBRIEFPROCEDURE_GEN_ALL_CORE_FT
PROCEDURES:  Exam under anesthesia, pelvic 24-Oct-2024 18:19:35  Sabrina Lopez  Robot-assisted laparoscopic hysterectomy with cystoscopy 24-Oct-2024 18:20:12  Sabrina Lopez  Bilateral salpingectomy with oophorectomy 24-Oct-2024 18:20:25  Sabrina Lopez  [place] lymph node mapping 24-Oct-2024 18:21:21  Sabrina Lopez

## 2024-10-24 NOTE — CHART NOTE - NSCHARTNOTEFT_GEN_A_CORE
R2 Gyn Onc Post-Op Check    Patient seen and examined at bedside, recently post-op. No acute complaints at this time. Denies CP, SOB, N/V, fevers, and chills.    Vital Signs Last 24 Hours  T(C): 37.1 (10-24-24 @ 19:55), Max: 37.1 (10-24-24 @ 19:55)  HR: 87 (10-24-24 @ 21:30) (78 - 91)  BP: 120/62 (10-24-24 @ 21:30) (120/62 - 141/71)  RR: 16 (10-24-24 @ 21:30) (16 - 18)  SpO2: 95% (10-24-24 @ 21:30) (95% - 100%)    I&O's Summary      Physical Exam:  General: NAD  CV: RRR  Lungs: nonlabored respirations  Abdomen: Soft, appropriately tender, non-distended  Incision: 5 lsc incisions CDI, midline vertical incision c/d/i    Labs:             13.1   17.67[H] )-----------( 251      ( 10-24 @ 20:25 )             40.7         MEDICATIONS  (STANDING):  acetaminophen   IVPB .. 1000 milliGRAM(s) IV Intermittent every 6 hours  heparin   Injectable 5000 Unit(s) SubCutaneous every 8 hours  lactated ringers. 1000 milliLiter(s) (125 mL/Hr) IV Continuous <Continuous>  senna 2 Tablet(s) Oral daily    MEDICATIONS  (PRN):  HYDROmorphone  Injectable 0.5 milliGRAM(s) IV Push every 10 minutes PRN Severe Pain (7 - 10)  ondansetron Injectable 4 milliGRAM(s) IV Push once PRN Nausea and/or Vomiting  ondansetron Injectable 4 milliGRAM(s) IV Push every 6 hours PRN Nausea and/or Vomiting  oxyCODONE    IR 5 milliGRAM(s) Oral every 4 hours PRN Severe Pain (7 - 10)  oxyCODONE    IR 5 milliGRAM(s) Oral once PRN Moderate Pain (4 - 6)  simethicone 80 milliGRAM(s) Chew every 6 hours PRN Gas      63yo s/p RA TLH, BSO, Cysto, Radical Nephrectomy recovering well in acute post-operative state.    Neuro: Pain controlled. IV Tylenol, Oxy PRN  CV: Hemodynamically stable  Pulm: Encourage oob/ambulation, incentive spirometer at bedside  GI: Advance diet as tolerated  : Guzman in place   Heme: HSQ and SCDs for DVT PPX  ID: afebrile  Endo: no active issues  Dispo: continue routine post-op care     Denies [employer] PGY2

## 2024-10-25 ENCOUNTER — TRANSCRIPTION ENCOUNTER (OUTPATIENT)
Age: 64
End: 2024-10-25

## 2024-10-25 VITALS
OXYGEN SATURATION: 98 % | HEART RATE: 78 BPM | TEMPERATURE: 98 F | SYSTOLIC BLOOD PRESSURE: 144 MMHG | RESPIRATION RATE: 16 BRPM | DIASTOLIC BLOOD PRESSURE: 71 MMHG

## 2024-10-25 DIAGNOSIS — Z98.890 OTHER SPECIFIED POSTPROCEDURAL STATES: ICD-10-CM

## 2024-10-25 LAB
ANION GAP SERPL CALC-SCNC: 12 MMOL/L — SIGNIFICANT CHANGE UP (ref 7–14)
BASOPHILS # BLD AUTO: 0.03 K/UL — SIGNIFICANT CHANGE UP (ref 0–0.2)
BASOPHILS NFR BLD AUTO: 0.2 % — SIGNIFICANT CHANGE UP (ref 0–2)
BUN SERPL-MCNC: 14 MG/DL — SIGNIFICANT CHANGE UP (ref 7–23)
CALCIUM SERPL-MCNC: 8.3 MG/DL — LOW (ref 8.4–10.5)
CHLORIDE SERPL-SCNC: 103 MMOL/L — SIGNIFICANT CHANGE UP (ref 98–107)
CO2 SERPL-SCNC: 24 MMOL/L — SIGNIFICANT CHANGE UP (ref 22–31)
CREAT SERPL-MCNC: 1.24 MG/DL — SIGNIFICANT CHANGE UP (ref 0.5–1.3)
EGFR: 49 ML/MIN/1.73M2 — LOW
EOSINOPHIL # BLD AUTO: 0 K/UL — SIGNIFICANT CHANGE UP (ref 0–0.5)
EOSINOPHIL NFR BLD AUTO: 0 % — SIGNIFICANT CHANGE UP (ref 0–6)
GLUCOSE SERPL-MCNC: 112 MG/DL — HIGH (ref 70–99)
HCT VFR BLD CALC: 36.4 % — SIGNIFICANT CHANGE UP (ref 34.5–45)
HGB BLD-MCNC: 11.9 G/DL — SIGNIFICANT CHANGE UP (ref 11.5–15.5)
IANC: 12.35 K/UL — HIGH (ref 1.8–7.4)
IMM GRANULOCYTES NFR BLD AUTO: 0.3 % — SIGNIFICANT CHANGE UP (ref 0–0.9)
LYMPHOCYTES # BLD AUTO: 1.69 K/UL — SIGNIFICANT CHANGE UP (ref 1–3.3)
LYMPHOCYTES # BLD AUTO: 11.3 % — LOW (ref 13–44)
MAGNESIUM SERPL-MCNC: 2.1 MG/DL — SIGNIFICANT CHANGE UP (ref 1.6–2.6)
MCHC RBC-ENTMCNC: 28.1 PG — SIGNIFICANT CHANGE UP (ref 27–34)
MCHC RBC-ENTMCNC: 32.7 GM/DL — SIGNIFICANT CHANGE UP (ref 32–36)
MCV RBC AUTO: 85.8 FL — SIGNIFICANT CHANGE UP (ref 80–100)
MONOCYTES # BLD AUTO: 0.77 K/UL — SIGNIFICANT CHANGE UP (ref 0–0.9)
MONOCYTES NFR BLD AUTO: 5.2 % — SIGNIFICANT CHANGE UP (ref 2–14)
NEUTROPHILS # BLD AUTO: 12.35 K/UL — HIGH (ref 1.8–7.4)
NEUTROPHILS NFR BLD AUTO: 83 % — HIGH (ref 43–77)
NRBC # BLD: 0 /100 WBCS — SIGNIFICANT CHANGE UP (ref 0–0)
NRBC # FLD: 0 K/UL — SIGNIFICANT CHANGE UP (ref 0–0)
PHOSPHATE SERPL-MCNC: 3.3 MG/DL — SIGNIFICANT CHANGE UP (ref 2.5–4.5)
PLATELET # BLD AUTO: 220 K/UL — SIGNIFICANT CHANGE UP (ref 150–400)
POTASSIUM SERPL-MCNC: 4.1 MMOL/L — SIGNIFICANT CHANGE UP (ref 3.5–5.3)
POTASSIUM SERPL-SCNC: 4.1 MMOL/L — SIGNIFICANT CHANGE UP (ref 3.5–5.3)
RBC # BLD: 4.24 M/UL — SIGNIFICANT CHANGE UP (ref 3.8–5.2)
RBC # FLD: 12.9 % — SIGNIFICANT CHANGE UP (ref 10.3–14.5)
SODIUM SERPL-SCNC: 139 MMOL/L — SIGNIFICANT CHANGE UP (ref 135–145)
WBC # BLD: 14.89 K/UL — HIGH (ref 3.8–10.5)
WBC # FLD AUTO: 14.89 K/UL — HIGH (ref 3.8–10.5)

## 2024-10-25 PROCEDURE — 99223 1ST HOSP IP/OBS HIGH 75: CPT

## 2024-10-25 RX ORDER — ROSUVASTATIN CALCIUM 10 MG
20 TABLET ORAL AT BEDTIME
Refills: 0 | Status: DISCONTINUED | OUTPATIENT
Start: 2024-10-25 | End: 2024-10-25

## 2024-10-25 RX ORDER — ACETAMINOPHEN 500 MG
975 TABLET ORAL EVERY 6 HOURS
Refills: 0 | Status: DISCONTINUED | OUTPATIENT
Start: 2024-10-25 | End: 2024-10-25

## 2024-10-25 RX ORDER — ROSUVASTATIN CALCIUM 20 MG/1
1 TABLET, COATED ORAL
Refills: 0 | DISCHARGE

## 2024-10-25 RX ORDER — CALCIUM CARBONATE 400(1000)
1 TABLET,CHEWABLE ORAL ONCE
Refills: 0 | Status: COMPLETED | OUTPATIENT
Start: 2024-10-25 | End: 2024-10-25

## 2024-10-25 RX ORDER — SODIUM CHLORIDE 9 MG/ML
3 INJECTION, SOLUTION INTRAMUSCULAR; INTRAVENOUS; SUBCUTANEOUS EVERY 8 HOURS
Refills: 0 | Status: DISCONTINUED | OUTPATIENT
Start: 2024-10-25 | End: 2024-10-25

## 2024-10-25 RX ORDER — IPRATROPIUM BROMIDE AND ALBUTEROL SULFATE .5; 2.5 MG/3ML; MG/3ML
3 SOLUTION RESPIRATORY (INHALATION) EVERY 6 HOURS
Refills: 0 | Status: DISCONTINUED | OUTPATIENT
Start: 2024-10-25 | End: 2024-10-25

## 2024-10-25 RX ORDER — ACETAMINOPHEN 500 MG
3 TABLET ORAL
Qty: 0 | Refills: 0 | DISCHARGE

## 2024-10-25 RX ORDER — OXYCODONE HYDROCHLORIDE 30 MG/1
1 TABLET ORAL
Qty: 8 | Refills: 0
Start: 2024-10-25

## 2024-10-25 RX ADMIN — ONDANSETRON HYDROCHLORIDE 4 MILLIGRAM(S): 2 INJECTION, SOLUTION INTRAMUSCULAR; INTRAVENOUS at 10:49

## 2024-10-25 RX ADMIN — SODIUM CHLORIDE 3 MILLILITER(S): 9 INJECTION, SOLUTION INTRAMUSCULAR; INTRAVENOUS; SUBCUTANEOUS at 13:12

## 2024-10-25 RX ADMIN — Medication 975 MILLIGRAM(S): at 18:42

## 2024-10-25 RX ADMIN — Medication 2 TABLET(S): at 10:55

## 2024-10-25 RX ADMIN — Medication 400 MILLIGRAM(S): at 05:31

## 2024-10-25 RX ADMIN — Medication 1 TABLET(S): at 10:54

## 2024-10-25 RX ADMIN — HEPARIN SODIUM 5000 UNIT(S): 10000 INJECTION INTRAVENOUS; SUBCUTANEOUS at 10:55

## 2024-10-25 RX ADMIN — Medication 1000 MILLIGRAM(S): at 00:34

## 2024-10-25 RX ADMIN — Medication 975 MILLIGRAM(S): at 12:05

## 2024-10-25 RX ADMIN — Medication 975 MILLIGRAM(S): at 11:31

## 2024-10-25 RX ADMIN — Medication 125 MILLILITER(S): at 04:15

## 2024-10-25 RX ADMIN — HEPARIN SODIUM 5000 UNIT(S): 10000 INJECTION INTRAVENOUS; SUBCUTANEOUS at 01:58

## 2024-10-25 RX ADMIN — Medication 1000 MILLIGRAM(S): at 06:31

## 2024-10-25 NOTE — PROGRESS NOTE ADULT - PROBLEM SELECTOR PLAN 1
Gyn Onc Fellow Addendum:    Pt seen and examined at bedside. Agree with above.    VS reviewed  Labs reviewed    Hemodynamically stable   Continue current pain regimen  CLD, advance per Urology team   Guzman in situ, plan to remove today with trial of void per urology team   Encourage ambulation and IS use  Replete lytes prn  DVT ppx: SCDs   Dispo: cont inpt mgmt     Verónica Mireles MD Gyn Onc Fellow Addendum:    Pt seen and examined at bedside. Agree with above.    VS reviewed  Labs reviewed    Hemodynamically stable   Continue current pain regimen  CLD, advance per Urology team   Guzman in situ, plan to remove today with trial of void per urology team   Encourage ambulation and IS use  Replete lytes prn  DVT ppx: SCDs, HSQ  Dispo: cont inpt mgmt     Verónica Mireles MD

## 2024-10-25 NOTE — DISCHARGE NOTE PROVIDER - NSDCCPTREATMENT_GEN_ALL_CORE_FT
PRINCIPAL PROCEDURE  Procedure: Robot-assisted laparoscopic hysterectomy with cystoscopy  Findings and Treatment:       SECONDARY PROCEDURE  Procedure: [place] lymph node mapping  Findings and Treatment:     Procedure: Bilateral salpingectomy with oophorectomy  Findings and Treatment:     Procedure: Exam under anesthesia, pelvic  Findings and Treatment:

## 2024-10-25 NOTE — PROGRESS NOTE ADULT - NSPROGADDITIONALINFOA_GEN_ALL_CORE
Seen in f/u ~674q. Son and  present. Labs reviewed. Surgery and findings disc. Instrx, dispo, and coverage disc.  note reviewed. Instrx and f/u disc. All Q/A. D/w F and LDS.

## 2024-10-25 NOTE — DISCHARGE NOTE PROVIDER - NSDCCPCAREPLAN_GEN_ALL_CORE_FT
PRINCIPAL DISCHARGE DIAGNOSIS  Diagnosis: Endometrial cancer  Assessment and Plan of Treatment:       SECONDARY DISCHARGE DIAGNOSES  Diagnosis: Post-operative state  Assessment and Plan of Treatment:

## 2024-10-25 NOTE — PROGRESS NOTE ADULT - SUBJECTIVE AND OBJECTIVE BOX
64y s/p RA [facility] BSO Cysto, SLNMD by Dr. Sanches and LSC left nephrectomy by Dr. Vaughn.   POD #1    Pt seen and examined at bedside. No overnight events.  Pt without complaints. Pain well controlled on current regimen.   She denies SOB/CP/palpitations, fever/chills, nausea/emesis. Tolerating regular diet.  Not yet OOB,  No flatus, aguilera in place.     MEDICATIONS  (STANDING):  acetaminophen   IVPB .. 1000 milliGRAM(s) IV Intermittent every 6 hours  heparin   Injectable 5000 Unit(s) SubCutaneous every 8 hours  lactated ringers. 1000 milliLiter(s) (125 mL/Hr) IV Continuous <Continuous>  senna 2 Tablet(s) Oral daily    MEDICATIONS  (PRN):  ondansetron Injectable 4 milliGRAM(s) IV Push every 6 hours PRN Nausea and/or Vomiting  oxyCODONE    IR 5 milliGRAM(s) Oral every 4 hours PRN Severe Pain (7 - 10)  simethicone 80 milliGRAM(s) Chew every 6 hours PRN Gas        Vital Signs Last 24 Hrs  T(C): 36.8 (25 Oct 2024 02:05), Max: 37.1 (24 Oct 2024 19:55)  T(F): 98.3 (25 Oct 2024 02:05), Max: 98.8 (24 Oct 2024 19:55)  HR: 89 (25 Oct 2024 02:05) (78 - 91)  BP: 115/61 (25 Oct 2024 02:05) (111/58 - 154/72)  BP(mean): 81 (24 Oct 2024 22:30) (74 - 92)  RR: 17 (25 Oct 2024 02:05) (12 - 18)  SpO2: 95% (25 Oct 2024 02:05) (92% - 100%)    Parameters below as of 25 Oct 2024 02:05  Patient On (Oxygen Delivery Method): room air        10-24 @ 07:01  -  10-25 @ 05:20  --------------------------------------------------------  IN: 845 mL / OUT: 575 mL / NET: 270 mL        PHYSICAL EXAM:    Gen: NAD, A+0 x 3  CV: RRR  Pulm: CTA BL  Abd: Soft, appropriately tender,  mildly distended, no rebound or guarding, +BS  Incision: port site incisions clean, dry and intact; dressings in place  Extremities: No swelling or calf tenderness, SCDs in place    Labs, additional tests:             13.1   17.67 )-----------( 251      ( 10-24 @ 20:25 )             40.7       10-24    140  |  105  |  13  ----------------------------<  145[H]  4.1   |  23  |  1.05    Ca    8.7      24 Oct 2024 20:25  Phos  3.8     10-24  Mg     1.70     10-24         64y s/p RA TLH BSO Cysto, SLNMD by Dr. Sanches and LSC left nephrectomy by Dr. Vaughn.   POD #1    Pt seen and examined at bedside. No overnight events.  Pt without complaints. Pain well controlled on current regimen.   She denies SOB/CP/palpitations, fever/chills, nausea/emesis. Tolerating CLD diet.  Not yet OOB,  No flatus, aguilera in place.     MEDICATIONS  (STANDING):  acetaminophen   IVPB .. 1000 milliGRAM(s) IV Intermittent every 6 hours  heparin   Injectable 5000 Unit(s) SubCutaneous every 8 hours  lactated ringers. 1000 milliLiter(s) (125 mL/Hr) IV Continuous <Continuous>  senna 2 Tablet(s) Oral daily    MEDICATIONS  (PRN):  ondansetron Injectable 4 milliGRAM(s) IV Push every 6 hours PRN Nausea and/or Vomiting  oxyCODONE    IR 5 milliGRAM(s) Oral every 4 hours PRN Severe Pain (7 - 10)  simethicone 80 milliGRAM(s) Chew every 6 hours PRN Gas        Vital Signs Last 24 Hrs  T(C): 36.8 (25 Oct 2024 02:05), Max: 37.1 (24 Oct 2024 19:55)  T(F): 98.3 (25 Oct 2024 02:05), Max: 98.8 (24 Oct 2024 19:55)  HR: 89 (25 Oct 2024 02:05) (78 - 91)  BP: 115/61 (25 Oct 2024 02:05) (111/58 - 154/72)  BP(mean): 81 (24 Oct 2024 22:30) (74 - 92)  RR: 17 (25 Oct 2024 02:05) (12 - 18)  SpO2: 95% (25 Oct 2024 02:05) (92% - 100%)    Parameters below as of 25 Oct 2024 02:05  Patient On (Oxygen Delivery Method): room air        10-24 @ 07:01  -  10-25 @ 05:20  --------------------------------------------------------  IN: 845 mL / OUT: 575 mL / NET: 270 mL        PHYSICAL EXAM:    Gen: NAD, A+0 x 3  CV: RRR  Pulm: CTA BL  Abd: Soft, appropriately tender,  mildly distended, no rebound or guarding, +BS  Incision: port site incisions clean, dry and intact; dressings in place  Extremities: No swelling or calf tenderness, SCDs in place    Labs, additional tests:             13.1   17.67 )-----------( 251      ( 10-24 @ 20:25 )             40.7       10-24    140  |  105  |  13  ----------------------------<  145[H]  4.1   |  23  |  1.05    Ca    8.7      24 Oct 2024 20:25  Phos  3.8     10-24  Mg     1.70     10-24         64y s/p RA H BSO Cysto, SLNMD by Dr. Sanches and LSC left nephrectomy by Dr. Vaughn.   POD #1    Pt seen and examined at bedside. No overnight events.  Pt without complaints. Reports intermittent abdominal cramping, but pain well controlled on current regimen.  Is tolerating CLD. States she is hungry and wants solid food.  Not yet OOB,  No flatus, aguilera in place.    She denies SOB/CP/palpitations, fever/chills, nausea/emesis.    MEDICATIONS  (STANDING):  acetaminophen   IVPB .. 1000 milliGRAM(s) IV Intermittent every 6 hours  heparin   Injectable 5000 Unit(s) SubCutaneous every 8 hours  lactated ringers. 1000 milliLiter(s) (125 mL/Hr) IV Continuous <Continuous>  senna 2 Tablet(s) Oral daily    MEDICATIONS  (PRN):  ondansetron Injectable 4 milliGRAM(s) IV Push every 6 hours PRN Nausea and/or Vomiting  oxyCODONE    IR 5 milliGRAM(s) Oral every 4 hours PRN Severe Pain (7 - 10)  simethicone 80 milliGRAM(s) Chew every 6 hours PRN Gas        Vital Signs Last 24 Hrs  T(C): 36.8 (25 Oct 2024 02:05), Max: 37.1 (24 Oct 2024 19:55)  T(F): 98.3 (25 Oct 2024 02:05), Max: 98.8 (24 Oct 2024 19:55)  HR: 89 (25 Oct 2024 02:05) (78 - 91)  BP: 115/61 (25 Oct 2024 02:05) (111/58 - 154/72)  BP(mean): 81 (24 Oct 2024 22:30) (74 - 92)  RR: 17 (25 Oct 2024 02:05) (12 - 18)  SpO2: 95% (25 Oct 2024 02:05) (92% - 100%)    Parameters below as of 25 Oct 2024 02:05  Patient On (Oxygen Delivery Method): room air        10-24 @ 07:01  -  10-25 @ 05:20  --------------------------------------------------------  IN: 845 mL / OUT: 575 mL / NET: 270 mL        PHYSICAL EXAM:    Gen: NAD, A+0 x 3  CV: RRR  Pulm: Unlabored breathing. Speaking in full sentences. Scant expiratory wheezes in upper lung fields.   Abd: Soft, appropriately tender,  mildly distended, no rebound or guarding, +BS  Incision: port site incisions clean, dry and intact; dressings in place  Extremities: No swelling or calf tenderness, SCDs in place    Labs, additional tests:             13.1   17.67 )-----------( 251      ( 10-24 @ 20:25 )             40.7       10-24    140  |  105  |  13  ----------------------------<  145[H]  4.1   |  23  |  1.05    Ca    8.7      24 Oct 2024 20:25  Phos  3.8     10-24  Mg     1.70     10-24

## 2024-10-25 NOTE — PROGRESS NOTE ADULT - ASSESSMENT
63y/o female s/p L Lap radical nephrectomy by Urology and DMITRIY TLZHANE, BSO by Gyn onc, with post-op pain, otherwise stable. POD#1    -may TOV from  standpoint  -Strict I&O's  -Analgesia prn  -Antiemetics prn  -DVT prophylaxis  -Incentive spirometry  -Clears   -Monitor GI function  -OOB  -AM labs reviewed  -Remainder of care per primary team

## 2024-10-25 NOTE — PROGRESS NOTE ADULT - ASSESSMENT
RA TLH BSO Cysto, SLNMD by Dr. Sanches and [facility] left nephrectomy by Dr. Vaughn. Patient is progressing appropriately post operatively. She is hemodynamically and clinically stable.     Neuro: Pain well controlled on current regimen. Transition to PO Tylenol this AM.    CV: Hemodynamically stable, f/u AM CBC  - PMH of HTN: c/w Lopressor PRN. Will resume home antihypertensive medication when home.   Pulm: O2 sat WNL on RA, Increase ambulation, encourage incentive spirometry use  GI: Tolerating regular diet  : Guzman in place. Management per urology.   - Holding NSAIDs and Lovenox in setting of nephrectomy   Heme: DVT ppx: HSQ, SCDs while in bed  ID: Afebrile, No signs of infection  FEN: LR@125, Replete electrolytes PRN   Dispo: discharge planning    Sabrina Lopez, PGY4 RA [facility] BSO Cysto, SLNMD by Dr. Sanches and [facility] left nephrectomy by Dr. Vaughn. Patient is progressing appropriately post operatively. She is hemodynamically and clinically stable.     Neuro: Pain well controlled on current regimen. Transition to PO Tylenol this AM.    CV: Hemodynamically stable, f/u AM CBC  - PMH of HTN: c/w Lopressor PRN. Will resume home antihypertensive medication when home.   Pulm: O2 sat WNL on RA, Increase ambulation, encourage incentive spirometry use  GI: Tolerating CLD diet. Advance to regular diet today.   : Guzman in place. Management per urology.   - Holding NSAIDs and Lovenox in setting of nephrectomy   Heme: DVT ppx: HSQ, SCDs while in bed  ID: Afebrile, No signs of infection  FEN: LR@125, Replete electrolytes PRN   Dispo: discharge planning    Sabrina Lopez, PGY4 RA [facility] BSO Cysto, SLNMD by Dr. Sanches and [facility] left nephrectomy by Dr. Vaughn. Patient is progressing appropriately post operatively. She is hemodynamically and clinically stable.     Neuro: Pain well controlled on current regimen. Transition to PO Tylenol this AM.    CV: Hemodynamically stable, f/u AM CBC  - PMH of HTN: c/w Lopressor PRN. Will resume home antihypertensive medication when home.   Pulm: O2 sat WNL on RA, Increase ambulation, encourage incentive spirometry use  GI: Tolerating CLD diet. Advance to regular diet today.   : Guzman in place. Management per urology.   - Holding NSAIDs and Lovenox in setting of nephrectomy   Heme: DVT ppx: HSQ, SCDs while in bed  ID: Afebrile, No signs of infection  FEN: LR@125, f/u AM BMP/Mg/Phos. Replete electrolytes PRN   Dispo: Continue inpatient post operative care.    Antonietta Esposito, PGY-2  Sabrina Lopez, PGY4

## 2024-10-25 NOTE — DISCHARGE NOTE PROVIDER - NSDCFUADDINST_GEN_ALL_CORE_FT
Postoperative Instructions    Pain control    For pain control, take the following:  Take Tylenol 975mg every 6 hours.  Add oxycodone as needed for severe pain not managed well by Tylenol. A prescription has been sent to your pharmacy on file.   Tylenol can be obtained over the counter.    Incision Care  Keep your incision(s) clean and dry. It is ok to shower, but do not scrub the incision sites--just allow the water to gently wash over your skin. Remove the outer dressing(s)--the band-aids--the second day after your surgery. There are small pieces of tape called steri-strips over the incisions underneath these dressings. Steri strips will be removed at your postoperative appointment.    Postoperative restrictions  Nothing in the vagina (tampons, sexual intercourse), No tub baths, pools or hot tubs for 8 weeks (showers are ok!). No lifting anything heavier than 15 lbs, no strenuous exercise for 8 weeks after surgery. Do not pull or cut any stitches that you see around your incision.    Vaginal bleeding  Spotting and intermittent passage of blood clots per vagina is normal in first few weeks after surgery. If you are soaking 1 pad per hour, that is not normal and you should notify Dr. Sanches's office and seek medical attention right away.    Vaginal discharge   Vaginal discharge (all colors) is normal after vaginal surgery. If you’ve had vaginal surgery, you have sutures in your vagina which take 3 months to fully absorb. You may have vaginal discharge during this time. This is normal.    Signs of Infection  Some postoperative pain and discomfort is normal. However, if you experience any of the following, you may be developing an infection and should be seen by your doctor: pain that does not get better with the oral medications listed above, fever greater than 100.4F, foul smelling discharge coming from the surgical site, nausea and vomiting that does not stop (especially if you are unable to tolerate oral intake), or inability to urinate. If you experience any of these symptoms, call your doctor's office to be seen right away.    Follow Up  Attend your postoperative appointment with Dr. Sanches on 11/8 at 1:10PM. The results from the procedure will be discussed with you at that time.  Call Dr. Vaughn's office to schedule a postoperative appointment in 2 weeks.

## 2024-10-25 NOTE — DISCHARGE NOTE NURSING/CASE MANAGEMENT/SOCIAL WORK - NSDCPEFALRISK_GEN_ALL_CORE
For information on Fall & Injury Prevention, visit: [email or web address] OR  [email or web address] OR  https://www.cdc.gov/steadi/patient.html

## 2024-10-25 NOTE — DISCHARGE NOTE NURSING/CASE MANAGEMENT/SOCIAL WORK - PATIENT PORTAL LINK FT
You can access the FollowMyHealth Patient Portal offered by [facility] by registering at the following website: [email or web address]. By joining [employer]’s FollowMyHealth portal, you will also be able to view your health information using other applications (apps) compatible with our system.

## 2024-10-25 NOTE — PROGRESS NOTE ADULT - SUBJECTIVE AND OBJECTIVE BOX
Subjective: pt seen and examined. c/o incision pain. no flatus yet    Objective    Vital signs  T(F): , Max: 98.8 (10-24-24 @ 19:55)  HR: 87 (10-25-24 @ 05:31)  BP: 134/61 (10-25-24 @ 05:31)  SpO2: 94% (10-25-24 @ 05:31)  Wt(kg): --    Output     OUT:    Indwelling Catheter - Urethral (mL): 725 mL  Total OUT: 725 mL    Total NET: -725 mL          Gen: NAD  Abd: soft, mildly distended, appropriately tender.  : aguilera with yellow urine    Labs                        11.9   14.89 )-----------( 220      ( 25 Oct 2024 05:55 )             36.4   10-25    139  |  103  |  14  ----------------------------<  112[H]  4.1   |  24  |  1.24    Ca    8.3[L]      25 Oct 2024 05:55  Phos  3.3     10-25  Mg     2.10     10-25       Subjective: pt seen and examined. c/o incision pain. no flatus yet    Objective    Vital signs  T(F): , Max: 98.8 (10-24-24 @ 19:55)  HR: 87 (10-25-24 @ 05:31)  BP: 134/61 (10-25-24 @ 05:31)  SpO2: 94% (10-25-24 @ 05:31)  Wt(kg): --    Output     OUT:    Indwelling Catheter - Urethral (mL): 725 mL  Total OUT: 725 mL    Total NET: -725 mL          Gen: NAD  Abd: soft, mildly distended, appropriately tender. incisions c/d/i  : aguilera with yellow urine    Labs                        11.9   14.89 )-----------( 220      ( 25 Oct 2024 05:55 )             36.4   10-25    139  |  103  |  14  ----------------------------<  112[H]  4.1   |  24  |  1.24    Ca    8.3[L]      25 Oct 2024 05:55  Phos  3.3     10-25  Mg     2.10     10-25

## 2024-10-25 NOTE — PROGRESS NOTE ADULT - ASSESSMENT
64 y.o. F w/ a hx of endometrial cancer, HLD, HTN, tobacco use, L renal mass now s/p scheduled robotic-assisted, total laparoscopic hysterectomy, bilateral salpingo-oophorectomy, lymph node sampling, cystoscopy and left radical nephrectomy.     # Endometrial cancer   - S/p scheduled robotic-assisted, total laparoscopic hysterectomy, bilateral salpingo-oophorectomy, lymph node sampling, cystoscopy  - DVT ppx: Heparin Q8h   - IVF   - Pain control: Tylenol ATC, Oxycodone   - Monitor for return of bowel function   - IS   - Management per primary     # L renal mass   - S/p L radical nephrectomy 10/24   - Post-op care as above   [ ] Path   - Management per Urology     # HTN   - Home amlodipine on hold, can resume if HTN     # HLD   - Please resume home rosuvastatin     # Tobacco use   # Wheezing   - Patient noted to have scattered wheezing on exam. Has not gotten PFT's. Advised patient to pursue outpatient PFT's for evaluation of possible COPD given extensive tobacco use hx   - Albuterol PRN   - Patient declined nicotine patch   - Encourage tobacco cessation     D/w primary team

## 2024-10-25 NOTE — DISCHARGE NOTE PROVIDER - NSDCMRMEDTOKEN_GEN_ALL_CORE_FT
amLODIPine 5 mg oral tablet: 1 tab(s) orally once a day  oxyCODONE 5 mg oral tablet: 1 tab(s) orally every 6 hours as needed for  severe pain MDD: 4  Tylenol 325 mg oral tablet: 3 tab(s) orally every 6 hours

## 2024-10-25 NOTE — DISCHARGE NOTE PROVIDER - CARE PROVIDERS DIRECT ADDRESSES
,DirectAddress_Unknown,[email or web address]

## 2024-10-25 NOTE — PROGRESS NOTE ADULT - SUBJECTIVE AND OBJECTIVE BOX
HPI: 64 y.o. F w/ a hx of endometrial cancer, HLD, HTN, tobacco use, L renal mass now s/p scheduled robotic-assisted, total laparoscopic hysterectomy, bilateral salpingo-oophorectomy, lymph node sampling, cystoscopy and left radical nephrectomy.   Patient reporting abdominal pain, 5/10, tolerable for patient. Reports some nausea, no vomiting. Denies lightheadedness. Reports longstanding tobacco use history, smoked 1.5 packs/day for 40+ years, cut down to 1/2 pack a day recently. No prior dx of COPD. Reports recent finding of vocal polyps s/p resection.       PAST MEDICAL & SURGICAL HISTORY:  High cholesterol      Hypertension      H/O bilateral cataract extraction      History of bilateral tubal ligation      H/O benign neoplasm of larynx      ROS:    Constitutional: [ ] fevers [ ] chills [ ] weight loss [ ] weight gain  HEENT: [ ] dry eyes [ ] eye irritation [ ] postnasal drip [ ] nasal congestion  CV: [ ] chest pain [ ] orthopnea [ ] palpitations [ ] murmur  Resp: [ ] cough [ ] shortness of breath [ ] dyspnea [ ] wheezing [ ] sputum [ ] hemoptysis  GI: [ x] nausea [ ] vomiting [ ] diarrhea [ ] constipation [ x] abd pain [ ] dysphagia   : [ ] dysuria [ ] nocturia [ ] hematuria [ ] increased urinary frequency  Musculoskeletal: [ ] back pain [ ] myalgias [ ] arthralgias [ ] fracture  Skin: [ ] rash [ ] itch  Neurological: [ ] headache [ ] dizziness [ ] syncope [ ] weakness [ ] numbness  Psychiatric: [ ] anxiety [ ] depression  Endocrine: [ ] diabetes [ ] thyroid problem  Hematologic/Lymphatic: [ ] anemia [ ] bleeding problem  Allergic/Immunologic: [ ] itchy eyes [ ] nasal discharge [ ] hives [ ] angioedema  [x] All other systems negative  [ ] Unable to assess ROS because ________    Allergies    Wellbutrin (Rash)  aloe (Rash)    Intolerances        Social History: 1.5 packs for 40+ years, now down to 1/2 pack a day         MEDICATIONS  (STANDING):  acetaminophen     Tablet .. 975 milliGRAM(s) Oral every 6 hours  heparin   Injectable 5000 Unit(s) SubCutaneous every 8 hours  lactated ringers. 1000 milliLiter(s) (125 mL/Hr) IV Continuous <Continuous>  senna 2 Tablet(s) Oral daily    MEDICATIONS  (PRN):  ondansetron Injectable 4 milliGRAM(s) IV Push every 6 hours PRN Nausea and/or Vomiting  oxyCODONE    IR 5 milliGRAM(s) Oral every 4 hours PRN Severe Pain (7 - 10)  simethicone 80 milliGRAM(s) Chew every 6 hours PRN Gas      Vital Signs Last 24 Hrs  T(C): 36.7 (25 Oct 2024 09:41), Max: 37.1 (24 Oct 2024 19:55)  T(F): 98.1 (25 Oct 2024 09:41), Max: 98.8 (24 Oct 2024 19:55)  HR: 74 (25 Oct 2024 09:41) (74 - 91)  BP: 133/56 (25 Oct 2024 09:41) (111/58 - 154/72)  BP(mean): 81 (24 Oct 2024 22:30) (74 - 92)  RR: 18 (25 Oct 2024 09:41) (12 - 18)  SpO2: 95% (25 Oct 2024 09:41) (92% - 100%)    Parameters below as of 25 Oct 2024 09:41  Patient On (Oxygen Delivery Method): room air      CAPILLARY BLOOD GLUCOSE            PHYSICAL EXAM:  GENERAL: NAD, well-developed  HEAD:  Atraumatic, Normocephalic  EYES: EOMI, conjunctiva and sclera clear  NECK: Supple, No JVD  CHEST/LUNG: scattered wheezing bilaterally; No wheeze  HEART: Regular rate and rhythm; No murmurs, rubs, or gallops  ABDOMEN: + steri strips intact, appropriately tender to palpation   EXTREMITIES: No clubbing, cyanosis, or edema  NEUROLOGY: non-focal  SKIN: No rashes or lesions    LABS:                        11.9   14.89 )-----------( 220      ( 25 Oct 2024 05:55 )             36.4     10-25    139  |  103  |  14  ----------------------------<  112[H]  4.1   |  24  |  1.24    Ca    8.3[L]      25 Oct 2024 05:55  Phos  3.3     10-25  Mg     2.10     10-25            Urinalysis Basic - ( 25 Oct 2024 05:55 )    Color: x / Appearance: x / SG: x / pH: x  Gluc: 112 mg/dL / Ketone: x  / Bili: x / Urobili: x   Blood: x / Protein: x / Nitrite: x   Leuk Esterase: x / RBC: x / WBC x   Sq Epi: x / Non Sq Epi: x / Bacteria: x        EKG(personally reviewed):    RADIOLOGY & ADDITIONAL TESTS:    Imaging Personally Reviewed:    Consultant(s) Notes Reviewed:      Care Discussed with Consultants/Other Providers:

## 2024-10-25 NOTE — DISCHARGE NOTE PROVIDER - PROVIDER TOKENS
PROVIDER:[TOKEN:[[id number]],SCHEDULEDAPPT:[11/08/2024],SCHEDULEDAPPTTIME:[01:10 PM]],PROVIDER:[TOKEN:[[id number]],FOLLOWUP:[2 weeks]]

## 2024-10-25 NOTE — DISCHARGE NOTE NURSING/CASE MANAGEMENT/SOCIAL WORK - FINANCIAL ASSISTANCE
[facility] provides services at a reduced cost to those who are determined to be eligible through [facility]’s financial assistance program. Information regarding [facility]’s financial assistance program can be found by going to [email or web address] or by calling [phone].

## 2024-10-25 NOTE — DISCHARGE NOTE PROVIDER - NSDCFUSCHEDAPPT_GEN_ALL_CORE_FT
Jaret Sanches  [facility]  GYNONC [address]  Scheduled Appointment: 11/08/2024    Tavon Mancini  [facility]  INTMED [address]  Scheduled Appointment: 01/10/2025    Jonel Sepulveda  [facility]  OTOLARYNG GAGE [address]  Scheduled Appointment: 01/21/2025

## 2024-10-25 NOTE — DISCHARGE NOTE PROVIDER - CARE PROVIDER_API CALL
Jaret Sanches  Gynecologic Oncology  [address] [place] [id number]  Phone: [phone]  Fax: [phone]  Scheduled Appointment: 11/08/2024 01:10 PM    Felipe Vaughn  Urology  [address] [place] [address], [address] [address]  Phone: [phone]  Fax: [phone]  Follow Up Time: 2 weeks

## 2024-10-25 NOTE — DISCHARGE NOTE PROVIDER - HOSPITAL COURSE
63yo s/p uncomplicated scheduled robot assisted total laparoscopic hysterectomy, bilateral salpingo-oophorectomy and cystoscopy by Dr. Sanches of Gyn Onc and laparoscopic left nephrectomy by Dr. Vaughn of Urology. EBL: 50. Please see operative note for full details. Patient was transferred to recovery room in stable condition.     On POD #1, Guzman catheter was discontinued and patient voided without issues. Patient's diet was advanced and she tolerated regular diet without issues. Her post operative labs and vitals were all within normal limits.     The patient was deemed stable for discharge on POD#1  as she was meeting postoperative milestones - tolerating regular diet, ambulating without difficulty, voiding, and pain was well controlled on oral medications. Throughout admission, patient received prophylactic anticoagulation. Patient was instructed to follow up with Dr. Sanches in 2 weeks.

## 2024-10-25 NOTE — DISCHARGE NOTE NURSING/CASE MANAGEMENT/SOCIAL WORK - NSDCPNINST_GEN_ALL_CORE
Call your Provider or go to the emergency room if any signs and symptoms of infection as evidenced by temperature less 100.4 degrees F, redness and/or Swollen at the incision site, pain worsened  and   not relieved with pain medicine.  in medicine.

## 2024-10-30 PROBLEM — I10 ESSENTIAL (PRIMARY) HYPERTENSION: Chronic | Status: ACTIVE | Noted: 2024-10-18

## 2024-11-08 ENCOUNTER — APPOINTMENT (OUTPATIENT)
Dept: GYNECOLOGIC ONCOLOGY | Facility: CLINIC | Age: 64
End: 2024-11-08
Payer: COMMERCIAL

## 2024-11-08 DIAGNOSIS — N28.89 OTHER SPECIFIED DISORDERS OF KIDNEY AND URETER: ICD-10-CM

## 2024-11-08 DIAGNOSIS — C54.1 MALIGNANT NEOPLASM OF ENDOMETRIUM: ICD-10-CM

## 2024-11-08 LAB — SURGICAL PATHOLOGY STUDY: SIGNIFICANT CHANGE UP

## 2024-11-08 PROCEDURE — 99024 POSTOP FOLLOW-UP VISIT: CPT

## 2024-11-11 ENCOUNTER — APPOINTMENT (OUTPATIENT)
Dept: UROLOGY | Facility: CLINIC | Age: 64
End: 2024-11-11

## 2024-11-20 ENCOUNTER — NON-APPOINTMENT (OUTPATIENT)
Age: 64
End: 2024-11-20

## 2024-11-26 ENCOUNTER — APPOINTMENT (OUTPATIENT)
Dept: UROLOGY | Facility: CLINIC | Age: 64
End: 2024-11-26
Payer: COMMERCIAL

## 2024-11-26 DIAGNOSIS — C64.2 MALIGNANT NEOPLASM OF LEFT KIDNEY, EXCEPT RENAL PELVIS: ICD-10-CM

## 2024-11-26 DIAGNOSIS — N28.89 OTHER SPECIFIED DISORDERS OF KIDNEY AND URETER: ICD-10-CM

## 2024-11-26 PROCEDURE — 99024 POSTOP FOLLOW-UP VISIT: CPT

## 2024-11-27 PROBLEM — C64.2 RENAL CELL CANCER, LEFT: Status: ACTIVE | Noted: 2024-11-27

## 2024-11-27 PROBLEM — N28.89 RENAL MASS, LEFT: Status: RESOLVED | Noted: 2024-10-13 | Resolved: 2024-11-27

## 2025-01-10 ENCOUNTER — APPOINTMENT (OUTPATIENT)
Dept: INTERNAL MEDICINE | Facility: CLINIC | Age: 65
End: 2025-01-10

## 2025-01-21 ENCOUNTER — APPOINTMENT (OUTPATIENT)
Dept: OTOLARYNGOLOGY | Facility: AMBULATORY MEDICAL SERVICES | Age: 65
End: 2025-01-21

## 2025-02-14 ENCOUNTER — NON-APPOINTMENT (OUTPATIENT)
Age: 65
End: 2025-02-14

## 2025-02-14 ENCOUNTER — APPOINTMENT (OUTPATIENT)
Dept: GYNECOLOGIC ONCOLOGY | Facility: CLINIC | Age: 65
End: 2025-02-14
Payer: COMMERCIAL

## 2025-02-14 VITALS
WEIGHT: 187 LBS | HEIGHT: 64 IN | TEMPERATURE: 97.3 F | SYSTOLIC BLOOD PRESSURE: 125 MMHG | HEART RATE: 85 BPM | OXYGEN SATURATION: 88 % | DIASTOLIC BLOOD PRESSURE: 83 MMHG | BODY MASS INDEX: 31.92 KG/M2

## 2025-02-14 DIAGNOSIS — C54.1 MALIGNANT NEOPLASM OF ENDOMETRIUM: ICD-10-CM

## 2025-02-14 DIAGNOSIS — C64.2 MALIGNANT NEOPLASM OF LEFT KIDNEY, EXCEPT RENAL PELVIS: ICD-10-CM

## 2025-02-14 PROCEDURE — 99214 OFFICE O/P EST MOD 30 MIN: CPT

## 2025-02-14 PROCEDURE — 99459 PELVIC EXAMINATION: CPT

## 2025-02-25 ENCOUNTER — APPOINTMENT (OUTPATIENT)
Dept: CARDIOLOGY | Facility: CLINIC | Age: 65
End: 2025-02-25

## 2025-04-18 ENCOUNTER — OUTPATIENT (OUTPATIENT)
Dept: OUTPATIENT SERVICES | Facility: HOSPITAL | Age: 65
LOS: 1 days | End: 2025-04-18
Payer: COMMERCIAL

## 2025-04-18 ENCOUNTER — APPOINTMENT (OUTPATIENT)
Dept: CT IMAGING | Facility: CLINIC | Age: 65
End: 2025-04-18

## 2025-04-18 DIAGNOSIS — Z86.018 PERSONAL HISTORY OF OTHER BENIGN NEOPLASM: Chronic | ICD-10-CM

## 2025-04-18 DIAGNOSIS — Z98.41 CATARACT EXTRACTION STATUS, RIGHT EYE: Chronic | ICD-10-CM

## 2025-04-18 DIAGNOSIS — C54.1 MALIGNANT NEOPLASM OF ENDOMETRIUM: ICD-10-CM

## 2025-04-18 DIAGNOSIS — C64.2 MALIGNANT NEOPLASM OF LEFT KIDNEY, EXCEPT RENAL PELVIS: ICD-10-CM

## 2025-04-18 PROCEDURE — 71260 CT THORAX DX C+: CPT | Mod: 26

## 2025-04-18 PROCEDURE — 74177 CT ABD & PELVIS W/CONTRAST: CPT

## 2025-04-18 PROCEDURE — 74177 CT ABD & PELVIS W/CONTRAST: CPT | Mod: 26

## 2025-04-18 PROCEDURE — 71260 CT THORAX DX C+: CPT

## 2025-04-18 PROCEDURE — 82565 ASSAY OF CREATININE: CPT

## 2025-04-26 ENCOUNTER — NON-APPOINTMENT (OUTPATIENT)
Age: 65
End: 2025-04-26

## 2025-05-02 ENCOUNTER — APPOINTMENT (OUTPATIENT)
Dept: UROLOGY | Facility: CLINIC | Age: 65
End: 2025-05-02
Payer: COMMERCIAL

## 2025-05-02 ENCOUNTER — APPOINTMENT (OUTPATIENT)
Dept: GYNECOLOGIC ONCOLOGY | Facility: CLINIC | Age: 65
End: 2025-05-02
Payer: COMMERCIAL

## 2025-05-02 VITALS
TEMPERATURE: 97.2 F | BODY MASS INDEX: 31.92 KG/M2 | HEART RATE: 82 BPM | SYSTOLIC BLOOD PRESSURE: 125 MMHG | DIASTOLIC BLOOD PRESSURE: 78 MMHG | HEIGHT: 64 IN | WEIGHT: 187 LBS | RESPIRATION RATE: 16 BRPM

## 2025-05-02 VITALS
HEART RATE: 95 BPM | BODY MASS INDEX: 31.76 KG/M2 | OXYGEN SATURATION: 96 % | WEIGHT: 186 LBS | SYSTOLIC BLOOD PRESSURE: 125 MMHG | TEMPERATURE: 98 F | DIASTOLIC BLOOD PRESSURE: 80 MMHG | HEIGHT: 64 IN

## 2025-05-02 DIAGNOSIS — R19.00 INTRA-ABDOMINAL AND PELVIC SWELLING, MASS AND LUMP, UNSPECIFIED SITE: ICD-10-CM

## 2025-05-02 DIAGNOSIS — C54.1 MALIGNANT NEOPLASM OF ENDOMETRIUM: ICD-10-CM

## 2025-05-02 DIAGNOSIS — C64.2 MALIGNANT NEOPLASM OF LEFT KIDNEY, EXCEPT RENAL PELVIS: ICD-10-CM

## 2025-05-02 DIAGNOSIS — R91.1 SOLITARY PULMONARY NODULE: ICD-10-CM

## 2025-05-02 DIAGNOSIS — I87.9 DISORDER OF VEIN, UNSPECIFIED: ICD-10-CM

## 2025-05-02 PROCEDURE — 99459 PELVIC EXAMINATION: CPT

## 2025-05-02 PROCEDURE — 99212 OFFICE O/P EST SF 10 MIN: CPT

## 2025-05-02 PROCEDURE — 99214 OFFICE O/P EST MOD 30 MIN: CPT

## 2025-05-04 PROBLEM — I87.9 VEIN DISORDER: Status: ACTIVE | Noted: 2025-05-04

## 2025-05-04 PROBLEM — R91.1 PULMONARY NODULE: Status: ACTIVE | Noted: 2025-05-04

## 2025-05-04 PROBLEM — R19.00 RETROPERITONEAL MASS: Status: ACTIVE | Noted: 2025-05-04

## 2025-05-30 ENCOUNTER — APPOINTMENT (OUTPATIENT)
Dept: MRI IMAGING | Facility: CLINIC | Age: 65
End: 2025-05-30

## 2025-05-30 PROCEDURE — A9585: CPT | Mod: JW

## 2025-05-30 PROCEDURE — 74183 MRI ABD W/O CNTR FLWD CNTR: CPT

## 2025-05-30 PROCEDURE — 72197 MRI PELVIS W/O & W/DYE: CPT

## 2025-06-03 ENCOUNTER — RX RENEWAL (OUTPATIENT)
Age: 65
End: 2025-06-03

## 2025-06-06 ENCOUNTER — APPOINTMENT (OUTPATIENT)
Dept: VASCULAR SURGERY | Facility: CLINIC | Age: 65
End: 2025-06-06
Payer: COMMERCIAL

## 2025-06-06 VITALS
SYSTOLIC BLOOD PRESSURE: 116 MMHG | HEART RATE: 80 BPM | HEIGHT: 64 IN | TEMPERATURE: 98.1 F | WEIGHT: 188 LBS | DIASTOLIC BLOOD PRESSURE: 78 MMHG | BODY MASS INDEX: 32.1 KG/M2

## 2025-06-06 VITALS — DIASTOLIC BLOOD PRESSURE: 71 MMHG | SYSTOLIC BLOOD PRESSURE: 106 MMHG | HEART RATE: 78 BPM

## 2025-06-06 PROCEDURE — 99204 OFFICE O/P NEW MOD 45 MIN: CPT

## 2025-07-01 ENCOUNTER — NON-APPOINTMENT (OUTPATIENT)
Age: 65
End: 2025-07-01

## 2025-08-01 ENCOUNTER — APPOINTMENT (OUTPATIENT)
Dept: OTOLARYNGOLOGY | Facility: CLINIC | Age: 65
End: 2025-08-01
Payer: COMMERCIAL

## 2025-08-01 VITALS
HEIGHT: 64 IN | WEIGHT: 192 LBS | BODY MASS INDEX: 32.78 KG/M2 | HEART RATE: 83 BPM | SYSTOLIC BLOOD PRESSURE: 120 MMHG | DIASTOLIC BLOOD PRESSURE: 80 MMHG

## 2025-08-01 DIAGNOSIS — J38.1 POLYP OF VOCAL CORD AND LARYNX: ICD-10-CM

## 2025-08-01 DIAGNOSIS — J34.2 DEVIATED NASAL SEPTUM: ICD-10-CM

## 2025-08-01 DIAGNOSIS — H61.21 IMPACTED CERUMEN, RIGHT EAR: ICD-10-CM

## 2025-08-01 DIAGNOSIS — J31.0 CHRONIC RHINITIS: ICD-10-CM

## 2025-08-01 PROCEDURE — 31575 DIAGNOSTIC LARYNGOSCOPY: CPT

## 2025-08-01 PROCEDURE — 99214 OFFICE O/P EST MOD 30 MIN: CPT | Mod: 25

## 2025-08-01 PROCEDURE — 69210 REMOVE IMPACTED EAR WAX UNI: CPT | Mod: RT

## 2025-08-15 ENCOUNTER — APPOINTMENT (OUTPATIENT)
Age: 65
End: 2025-08-15
Payer: COMMERCIAL

## 2025-08-15 ENCOUNTER — NON-APPOINTMENT (OUTPATIENT)
Age: 65
End: 2025-08-15

## 2025-08-15 VITALS
BODY MASS INDEX: 32.78 KG/M2 | DIASTOLIC BLOOD PRESSURE: 84 MMHG | WEIGHT: 192 LBS | HEART RATE: 74 BPM | OXYGEN SATURATION: 97 % | HEIGHT: 64 IN | RESPIRATION RATE: 16 BRPM | SYSTOLIC BLOOD PRESSURE: 129 MMHG

## 2025-08-15 VITALS
HEART RATE: 87 BPM | WEIGHT: 192 LBS | OXYGEN SATURATION: 96 % | SYSTOLIC BLOOD PRESSURE: 117 MMHG | HEIGHT: 64 IN | BODY MASS INDEX: 32.78 KG/M2 | DIASTOLIC BLOOD PRESSURE: 63 MMHG

## 2025-08-15 DIAGNOSIS — Z00.00 ENCOUNTER FOR GENERAL ADULT MEDICAL EXAMINATION W/OUT ABNORMAL FINDINGS: ICD-10-CM

## 2025-08-15 DIAGNOSIS — I10 ESSENTIAL (PRIMARY) HYPERTENSION: ICD-10-CM

## 2025-08-15 DIAGNOSIS — F17.200 NICOTINE DEPENDENCE, UNSPECIFIED, UNCOMPLICATED: ICD-10-CM

## 2025-08-15 DIAGNOSIS — Z01.818 ENCOUNTER FOR OTHER PREPROCEDURAL EXAMINATION: ICD-10-CM

## 2025-08-15 DIAGNOSIS — Z86.79 PERSONAL HISTORY OF OTHER DISEASES OF THE CIRCULATORY SYSTEM: ICD-10-CM

## 2025-08-15 DIAGNOSIS — E66.3 OVERWEIGHT: ICD-10-CM

## 2025-08-15 DIAGNOSIS — R73.9 HYPERGLYCEMIA, UNSPECIFIED: ICD-10-CM

## 2025-08-15 DIAGNOSIS — Z01.83 ENCOUNTER FOR BLOOD TYPING: ICD-10-CM

## 2025-08-15 DIAGNOSIS — R49.0 DYSPHONIA: ICD-10-CM

## 2025-08-15 DIAGNOSIS — J38.3 OTHER DISEASES OF VOCAL CORDS: ICD-10-CM

## 2025-08-15 DIAGNOSIS — E78.5 HYPERLIPIDEMIA, UNSPECIFIED: ICD-10-CM

## 2025-08-15 DIAGNOSIS — E66.9 OVERWEIGHT: ICD-10-CM

## 2025-08-15 PROCEDURE — 93000 ELECTROCARDIOGRAM COMPLETE: CPT

## 2025-08-15 PROCEDURE — 99214 OFFICE O/P EST MOD 30 MIN: CPT

## 2025-08-15 PROCEDURE — G2211 COMPLEX E/M VISIT ADD ON: CPT | Mod: NC

## 2025-08-15 PROCEDURE — 36415 COLL VENOUS BLD VENIPUNCTURE: CPT

## 2025-08-15 PROCEDURE — 99214 OFFICE O/P EST MOD 30 MIN: CPT | Mod: 25

## 2025-08-17 PROBLEM — Z01.83 ENCOUNTER FOR BLOOD TYPING: Status: ACTIVE | Noted: 2025-08-15

## 2025-08-17 PROBLEM — Z86.79 HISTORY OF HYPERTENSION: Status: RESOLVED | Noted: 2024-05-24 | Resolved: 2025-08-17

## 2025-08-17 LAB
25(OH)D3 SERPL-MCNC: 40 NG/ML
ALBUMIN SERPL ELPH-MCNC: 4.3 G/DL
ALP BLD-CCNC: 123 U/L
ALT SERPL-CCNC: 13 U/L
ANION GAP SERPL CALC-SCNC: 14 MMOL/L
APTT BLD: 32.7 SEC
AST SERPL-CCNC: 25 U/L
BASOPHILS # BLD AUTO: 0.06 K/UL
BASOPHILS NFR BLD AUTO: 0.8 %
BILIRUB SERPL-MCNC: 0.2 MG/DL
BUN SERPL-MCNC: 21 MG/DL
CALCIUM SERPL-MCNC: 9.7 MG/DL
CHLORIDE SERPL-SCNC: 103 MMOL/L
CHOLEST SERPL-MCNC: 159 MG/DL
CO2 SERPL-SCNC: 25 MMOL/L
CREAT SERPL-MCNC: 1.32 MG/DL
EGFRCR SERPLBLD CKD-EPI 2021: 45 ML/MIN/1.73M2
EOSINOPHIL # BLD AUTO: 0.18 K/UL
EOSINOPHIL NFR BLD AUTO: 2.3 %
ESTIMATED AVERAGE GLUCOSE: 126 MG/DL
GLUCOSE SERPL-MCNC: 97 MG/DL
HBA1C MFR BLD HPLC: 6 %
HCT VFR BLD CALC: 42.6 %
HDLC SERPL-MCNC: 34 MG/DL
HGB BLD-MCNC: 13.7 G/DL
IMM GRANULOCYTES NFR BLD AUTO: 0.3 %
INR PPP: 0.89 RATIO
LDLC SERPL-MCNC: 77 MG/DL
LYMPHOCYTES # BLD AUTO: 2.28 K/UL
LYMPHOCYTES NFR BLD AUTO: 28.9 %
MAN DIFF?: NORMAL
MCHC RBC-ENTMCNC: 28.2 PG
MCHC RBC-ENTMCNC: 32.2 G/DL
MCV RBC AUTO: 87.7 FL
MONOCYTES # BLD AUTO: 0.45 K/UL
MONOCYTES NFR BLD AUTO: 5.7 %
NEUTROPHILS # BLD AUTO: 4.89 K/UL
NEUTROPHILS NFR BLD AUTO: 62 %
NONHDLC SERPL-MCNC: 125 MG/DL
PLATELET # BLD AUTO: 254 K/UL
POTASSIUM SERPL-SCNC: 4.5 MMOL/L
PROT SERPL-MCNC: 6.7 G/DL
PT BLD: 10.5 SEC
RBC # BLD: 4.86 M/UL
RBC # FLD: 13.1 %
SODIUM SERPL-SCNC: 142 MMOL/L
TRIGL SERPL-MCNC: 296 MG/DL
TSH SERPL-ACNC: 0.74 UIU/ML
WBC # FLD AUTO: 7.88 K/UL

## 2025-08-18 LAB — ABORH: NORMAL

## 2025-08-21 ENCOUNTER — RESULT REVIEW (OUTPATIENT)
Age: 65
End: 2025-08-21

## 2025-08-21 ENCOUNTER — APPOINTMENT (OUTPATIENT)
Dept: OTOLARYNGOLOGY | Facility: AMBULATORY MEDICAL SERVICES | Age: 65
End: 2025-08-21

## 2025-08-21 PROCEDURE — 31570 LARYNGOSCOPE W/VC INJ: CPT

## 2025-08-21 PROCEDURE — 31541 LARYNSCOP W/TUMR EXC + SCOPE: CPT

## 2025-09-05 ENCOUNTER — APPOINTMENT (OUTPATIENT)
Dept: GYNECOLOGIC ONCOLOGY | Facility: CLINIC | Age: 65
End: 2025-09-05
Payer: COMMERCIAL

## 2025-09-05 VITALS
SYSTOLIC BLOOD PRESSURE: 120 MMHG | WEIGHT: 193 LBS | BODY MASS INDEX: 33.13 KG/M2 | TEMPERATURE: 98.6 F | HEART RATE: 97 BPM | OXYGEN SATURATION: 95 % | DIASTOLIC BLOOD PRESSURE: 81 MMHG

## 2025-09-05 DIAGNOSIS — R19.00 INTRA-ABDOMINAL AND PELVIC SWELLING, MASS AND LUMP, UNSPECIFIED SITE: ICD-10-CM

## 2025-09-05 DIAGNOSIS — L81.9 DISORDER OF PIGMENTATION, UNSPECIFIED: ICD-10-CM

## 2025-09-05 DIAGNOSIS — C64.2 MALIGNANT NEOPLASM OF LEFT KIDNEY, EXCEPT RENAL PELVIS: ICD-10-CM

## 2025-09-05 DIAGNOSIS — C54.1 MALIGNANT NEOPLASM OF ENDOMETRIUM: ICD-10-CM

## 2025-09-05 PROCEDURE — 99459 PELVIC EXAMINATION: CPT

## 2025-09-05 PROCEDURE — 99214 OFFICE O/P EST MOD 30 MIN: CPT

## 2025-09-09 ENCOUNTER — APPOINTMENT (OUTPATIENT)
Dept: OTOLARYNGOLOGY | Facility: CLINIC | Age: 65
End: 2025-09-09
Payer: COMMERCIAL

## 2025-09-09 VITALS — HEART RATE: 98 BPM | SYSTOLIC BLOOD PRESSURE: 122 MMHG | DIASTOLIC BLOOD PRESSURE: 78 MMHG | HEIGHT: 64 IN

## 2025-09-09 DIAGNOSIS — J34.2 DEVIATED NASAL SEPTUM: ICD-10-CM

## 2025-09-09 DIAGNOSIS — F17.200 NICOTINE DEPENDENCE, UNSPECIFIED, UNCOMPLICATED: ICD-10-CM

## 2025-09-09 DIAGNOSIS — J31.0 CHRONIC RHINITIS: ICD-10-CM

## 2025-09-09 DIAGNOSIS — R49.0 DYSPHONIA: ICD-10-CM

## 2025-09-09 DIAGNOSIS — J38.1 POLYP OF VOCAL CORD AND LARYNX: ICD-10-CM

## 2025-09-09 DIAGNOSIS — J38.3 OTHER DISEASES OF VOCAL CORDS: ICD-10-CM

## 2025-09-09 PROCEDURE — 31575 DIAGNOSTIC LARYNGOSCOPY: CPT

## 2025-09-09 PROCEDURE — 99213 OFFICE O/P EST LOW 20 MIN: CPT | Mod: 25

## 2025-09-19 ENCOUNTER — APPOINTMENT (OUTPATIENT)
Dept: MAMMOGRAPHY | Facility: CLINIC | Age: 65
End: 2025-09-19
Payer: COMMERCIAL

## 2025-09-19 ENCOUNTER — RESULT REVIEW (OUTPATIENT)
Age: 65
End: 2025-09-19

## 2025-09-19 ENCOUNTER — APPOINTMENT (OUTPATIENT)
Dept: ULTRASOUND IMAGING | Facility: CLINIC | Age: 65
End: 2025-09-19
Payer: COMMERCIAL

## 2025-09-19 PROCEDURE — 76641 ULTRASOUND BREAST COMPLETE: CPT | Mod: 50

## 2025-09-19 PROCEDURE — 77063 BREAST TOMOSYNTHESIS BI: CPT

## 2025-09-19 PROCEDURE — 77067 SCR MAMMO BI INCL CAD: CPT

## (undated) DEVICE — WARMING BLANKET UPPER ADULT

## (undated) DEVICE — SHEARS COVIDIEN ENDO SHEAR 5MM X 31CM W UNIPOLAR CAUTERY

## (undated) DEVICE — XI OBTURATOR OPTICAL BLADELESS 8MM

## (undated) DEVICE — XI TIP COVER

## (undated) DEVICE — UTERINE MANIPULATOR CONMED VCARE SM 32MM

## (undated) DEVICE — POSITIONER PINK PAD PIGAZZI SYSTEM

## (undated) DEVICE — XI SEAL UNIVERSIAL 5-12MM

## (undated) DEVICE — VENODYNE/SCD SLEEVE CALF MEDIUM

## (undated) DEVICE — SPECIMEN CONTAINER 100ML

## (undated) DEVICE — XI DRAPE ARM

## (undated) DEVICE — SOL IRR POUR H2O 250ML

## (undated) DEVICE — UTERINE MANIPULATOR CONMED VCARE LG 37MM

## (undated) DEVICE — D HELP - CLEARVIEW CLEARIFY SYSTEM

## (undated) DEVICE — TUBING STRYKEFLOW II SUCTION / IRRIGATOR

## (undated) DEVICE — POSITIONER STRAP ARMBOARD VELCRO TS-30

## (undated) DEVICE — DRSG OPSITE 13.75 X 4"

## (undated) DEVICE — POSITIONER PURPLE ARM ONE STEP (LARGE)

## (undated) DEVICE — PACK GENERAL LAPAROSCOPY

## (undated) DEVICE — XI ARM SCISSOR MONO CURVED

## (undated) DEVICE — XI ARM FORCEP MARYLAND BIPOLAR

## (undated) DEVICE — SUT POLYSORB 0 30" GS-22

## (undated) DEVICE — POSITIONER FOAM EGG CRATE ULNAR 2PCS (PINK)

## (undated) DEVICE — INSUFFLATION NDL COVIDIEN SURGINEEDLE VERESS 150MM LONG

## (undated) DEVICE — SUT CLIP LAPRA-TY ABSORBABLE SIZE 0.118 TO 0.12" VIOLET

## (undated) DEVICE — PREP BETADINE KIT

## (undated) DEVICE — BLADE SCALPEL SAFETYLOCK #15

## (undated) DEVICE — LABELS BLANK W PEN

## (undated) DEVICE — GOWN XL

## (undated) DEVICE — DRSG BENZOIN 0.6CC

## (undated) DEVICE — SUT VICRYL 0 27" UR-6

## (undated) DEVICE — CANISTER DISPOSABLE THIN WALL 3000CC

## (undated) DEVICE — XI ARM FORCEP PROGRASP 8MM

## (undated) DEVICE — PACK ROBOTIC LIJ

## (undated) DEVICE — ELCTR GROUNDING PAD ADULT COVIDIEN

## (undated) DEVICE — RETRACTOR COVIDIEN ENDOPADDLE 12MM DISP

## (undated) DEVICE — GLV 7 PROTEXIS (WHITE)

## (undated) DEVICE — INSUFFLATION NDL COVIDIEN SURGINEEDLE VERESS 120MM

## (undated) DEVICE — FOLEY TRAY 16FR 5CC LF UMETER CLOSED

## (undated) DEVICE — DRAPE LIGHT HANDLE COVER (BLUE)

## (undated) DEVICE — RUMI TIP GREEN 6.7MM X 10CM

## (undated) DEVICE — TAPE SILK 3"

## (undated) DEVICE — ELCTR BOVIE PENCIL SMOKE EVACUATION

## (undated) DEVICE — LUBRICATING JELLY ONESHOT 1.25OZ

## (undated) DEVICE — LAP PAD 4 X 18"

## (undated) DEVICE — GLV 7.5 PROTEXIS (WHITE)

## (undated) DEVICE — BLADE SURGICAL #15 CARBON

## (undated) DEVICE — XI ARM NEEDLE DRIVER MEGA

## (undated) DEVICE — TUBING AIRSEAL TRI-LUMEN FILTERED

## (undated) DEVICE — SUT CHROMIC 0 27" SH

## (undated) DEVICE — SYR LUER LOK 20CC

## (undated) DEVICE — XI ARM DISSECTOR CURVED BIPOLAR 8MM

## (undated) DEVICE — TUBING OLYMPUS INSUFFLATION

## (undated) DEVICE — XI ARM FORCEP FENESTRATED BIPOLAR 8MM

## (undated) DEVICE — SUT VICRYL PLUS 0 27" CT-3

## (undated) DEVICE — STAPLER COVIDIEN ENDO GIA STANDARD HANDLE

## (undated) DEVICE — TIP METZENBAUM SCISSOR MONOPOLAR ENDOCUT (ORANGE)

## (undated) DEVICE — LIGASURE ATLAS 10MM 37CM

## (undated) DEVICE — TROCAR SURGIQUEST AIRSEAL 12MMX100MM

## (undated) DEVICE — SOL IRR POUR H2O 1500ML

## (undated) DEVICE — SOL IRR BAG H2O 3000ML

## (undated) DEVICE — XI CORD BIPOLAR CAUTERY (BLUE)

## (undated) DEVICE — DRSG STERISTRIPS 0.5 X 4"

## (undated) DEVICE — SYR LUER LOK 10CC

## (undated) DEVICE — WARMING BLANKET FULL ADULT

## (undated) DEVICE — SUT VICRYL 2-0 27" SH UNDYED

## (undated) DEVICE — SUT POLYSORB 0 30" GS-22 UNDYED

## (undated) DEVICE — SOL IRR POUR NS 0.9% 500ML

## (undated) DEVICE — UTERINE MANIPULATOR CONMED VCARE MED 34MM

## (undated) DEVICE — XI CORD MONOPOLAR CAUTERY (GREEN)

## (undated) DEVICE — TUBING INSUFFLATION LAP FILTER 10FT

## (undated) DEVICE — FOLEY TRAY 16FR 5CC LTX UMETER CLOSED

## (undated) DEVICE — XI ARM NEEDLE DRIVER LARGE

## (undated) DEVICE — SHEARS COVIDIEN ENDO SHEAR 5MM X 45CM LONG W MONOPOLAR CAUTERY

## (undated) DEVICE — SUT CAPROSYN 4-0 P-12 UNDYED

## (undated) DEVICE — XI DRAPE COLUMN

## (undated) DEVICE — SUT VICRYL 1 36" CT-1 UNDYED

## (undated) DEVICE — DRAPE INSTRUMENT POUCH 6.75" X 11"

## (undated) DEVICE — SUT VICRYL PLUS 4-0 27" PS-2 UNDYED

## (undated) DEVICE — Device

## (undated) DEVICE — TROCAR COVIDIEN VERSAPORT BLADELESS OPTICAL 5MM STANDARD

## (undated) DEVICE — FOLEY CATH 2-WAY 26FR 5CC SILICONE

## (undated) DEVICE — LIGASURE BLUNT TIP 37CM

## (undated) DEVICE — SUT VICRYL 0 27" CT-1 UNDYED

## (undated) DEVICE — TROCAR COVIDIEN VERSAONE BLADED FIXATION 12MM STANDARD

## (undated) DEVICE — LIJ/LIA-HOLDER SCOPE: Type: DURABLE MEDICAL EQUIPMENT

## (undated) DEVICE — PACK D&C

## (undated) DEVICE — NDL HYPO REGULAR BEVEL 22G X 1.5" (TURQUOISE)

## (undated) DEVICE — SUT PDS II 1 48" TP-1

## (undated) DEVICE — ENDOCATCH II 15MM

## (undated) DEVICE — INSUFFLATION NDL ETHICON ENDOPATH VERESS 14G 120MM

## (undated) DEVICE — ELCTR HANDSWITCHING 5MM ABC

## (undated) DEVICE — GLV 7.5 PROTEXIS (BLUE)

## (undated) DEVICE — DRAIN RESERVOIR FOR JACKSON PRATT 100CC CARDINAL

## (undated) DEVICE — BASIN SET SINGLE